# Patient Record
Sex: FEMALE | Race: WHITE
[De-identification: names, ages, dates, MRNs, and addresses within clinical notes are randomized per-mention and may not be internally consistent; named-entity substitution may affect disease eponyms.]

---

## 2017-02-01 ENCOUNTER — HOSPITAL ENCOUNTER (OUTPATIENT)
Dept: HOSPITAL 62 - RAD | Age: 67
End: 2017-02-01
Attending: SPECIALIST
Payer: COMMERCIAL

## 2017-02-01 DIAGNOSIS — R07.9: Primary | ICD-10-CM

## 2017-02-01 DIAGNOSIS — R06.00: ICD-10-CM

## 2017-02-01 PROCEDURE — A9500 TC99M SESTAMIBI: HCPCS

## 2017-02-01 PROCEDURE — 93017 CV STRESS TEST TRACING ONLY: CPT

## 2017-02-01 PROCEDURE — 78452 HT MUSCLE IMAGE SPECT MULT: CPT

## 2017-02-01 NOTE — DRAGON STRESS TEST REPORT
INTRAVENOUS LEXISCAN CARDIOLITE STRESS TEST USING SINGLE PHOTON EMMISION 
COMPUTERIZED TOMOGRAPHIC.



DATE OF PROCEDURE: February 1, 2017



INDICATION : Chest pain and shortness of breath



CARDIAC RISK FACTORS: Diabetes, hypertension



RESTING EKG: Sinus rhythm, right bundle branch block pattern with secondary ST-
T wave changes



STRESS EKG: No significant changes noted with LexiScan bolus 



REASON FOR TERMINATION: Protocol.





PROCEDURE REPORT: Baseline heart rate 67 beats per minute with blood pressure 
of on 144/88.  Patient had no significant complaints.  Heart rate at 2 minutes 
post bolus 91 with a blood pressure of 148/74.  3 minutes post bolus heart rate 
86 with blood pressure of 154/71.  No significant EKG changes were noted.  
Patient had no significant complaints during the procedure or postprocedure.



CONCLUSIONS: Normal EKG and hemodynamic response to IV LexiScan.







NUCLEAR DATA:

At rest the patient was given 13.71 millicuries of technetium 99 sestamibi 
injected intravenously.  As per protocol rest gated SPECT images were obtained.
  Subsequently the patient was given intravenous LexiScan at a dose of 0.4 mg 
in 5 mL intravenously, followed by flush with normal saline.  Subsequently the 
stress dose of 38.6 millicuries of technetium 99 sestamibi was injected 
intravenously.  As per protocol stress gated images were obtained.  



NUCLEAR INTERPRETATION: Both raw and processed data were used for 
interpretation.  Visual, qualitative, computer-generated quantitative data was 
used.  There was good myocardial uptake of technetium compound.  Motion 
artifact and soft tissue attenuations were noted.  Increased visceral uptake 
was noted.  No definitive areas of transient perfusion defect noted.  No 
definitive areas of fixed perfusion defect or scars noted.



EKG gated imaging showed LV EF at 67 %, rest and stress gated EF similar 
visually.  T. I D. ratio was 1.08.  Lung heart ratio noted to be within normal 
limits 0.26.  No significant extracardiac and abnormal radiotracer activities 
were noted.  RV free wall uptake was noted to be increased.



IMPRESSION: Also refer to comments under nuclear interpretation. Also test 
results needs to be interpreted in the context of pretest probability.





1.  There is no definitive scintigraphic evidence of LexiScan induced 
myocardial ischemia.

2.  There is no definitive scintigraphic evidence of myocardial infarction/scar.

3.  EKG gated imaging shows left ejection fraction of approximately 67 %. 

4.  Clinical correlation requested as occasionally single vessel disease or 
balanced ischemia could be missed. In approximately 10% of the cases Lexiscan 
may not cause adequate vasodilatory stress.



RECOMMENDATIONS:

Aggressive risk factor modification, medical therapy.

Clinical correlation with echocardiogram derived ejection fraction.

Inability to exercise by itself can lead to increased cardiovascular event 
risks.

Consider cardiology consultation if clinically indicated.

I AM AVAILABLE FOR CARDIOLOGY CONSULTATION AND FOLLOWUP IF REQUESTED BY PMD











Pino Nicole M.D., [facility]

Consultant cardiologist,

Board certified in cardiovascular diseases, 

Nuclear cardiology, 

Echocardiography

Cardiac CT and cardiac MRI

Ph. [phone] 

Fax [phone]
[facility]

## 2017-06-13 ENCOUNTER — HOSPITAL ENCOUNTER (OUTPATIENT)
Dept: HOSPITAL 62 - RAD | Age: 67
End: 2017-06-13
Attending: ORTHOPAEDIC SURGERY
Payer: COMMERCIAL

## 2017-06-13 DIAGNOSIS — M75.112: Primary | ICD-10-CM

## 2017-06-13 DIAGNOSIS — M75.52: ICD-10-CM

## 2017-06-13 DIAGNOSIS — M19.012: ICD-10-CM

## 2017-06-13 PROCEDURE — 73040 CONTRAST X-RAY OF SHOULDER: CPT

## 2017-06-13 PROCEDURE — 77002 NEEDLE LOCALIZATION BY XRAY: CPT

## 2017-06-13 PROCEDURE — A9576 INJ PROHANCE MULTIPACK: HCPCS

## 2017-06-13 PROCEDURE — BP09ZZZ PLAIN RADIOGRAPHY OF LEFT SHOULDER: ICD-10-PCS

## 2017-06-13 PROCEDURE — 73222 MRI JOINT UPR EXTREM W/DYE: CPT

## 2017-06-13 NOTE — RADIOLOGY REPORT (SQ)
EXAM DESCRIPTION:  ARTHRO SHOULDER; FLUORO/NEEDLE PLACEMENT



COMPLETED DATE/TIME:  6/13/2017 1:40 pm



REASON FOR STUDY:  INCOMPLETE ROTATOR CUFF TEAR OR RUPTURE OF L SHOULDER M75.112  INCOMPLETE ROTATR-C
UFF TEAR/RUPTR OF L SHOULDER, NOT



COMPARISON:  None.



FLUOROSCOPY TIME:  19 seconds.

1 images saved to PACS.



LIMITATIONS:  None.



PROCEDURE:  Procedure, risks, benefits and alternatives explained to patient who then gave written co
nsent. The left shoulder was marked and a time out was called for correct procedure verification.  Po
sterior entry site marked using fluoroscopic guidance.  Shoulder prepped and draped using sterile nolvia
hnique.  Local anesthesia achieved using 1% lidocaine injection.  Hypodermic needle introduced into t
he joint space under direct fluoroscopic visualization. Non-ionic contrast instilled to confirm intra
-articular position. Dilute gadolinium solution then injected.  Needle removed and entry site covered
 with sterile bandage. No immediate complications noted.



TECHNIQUE:  Digital images acquired during fluoroscopy and stored on PACS.   Patient immediately take
n to the MR suite for additional imaging.

INJECTION LOCATION: Posterior left shoulder.

CONTRAST TYPE AND AMOUNT: 1 mL Isovue-300 and 10 mL ProHance saline mixture.



IMPRESSION:  SUCCESSFUL NEEDLE PLACEMENT AND INJECTION FOR LEFT SHOULDER MR ARTHROGRAM USING POSTERIO
R APPROACH.



COMMENT:  Quality :  Final reports for procedures using fluoroscopy that document radiation exp
osure indices, or exposure time and number of fluorographic images (if radiation exposure indices are
 not available)



TECHNICAL DOCUMENTATION:  JOB ID:  [id number]

 2011 [employer]- All Rights Reserved

## 2017-06-13 NOTE — RADIOLOGY REPORT (SQ)
EXAM DESCRIPTION:  ARTHRO SHOULDER; FLUORO/NEEDLE PLACEMENT



COMPLETED DATE/TIME:  6/13/2017 1:40 pm



REASON FOR STUDY:  INCOMPLETE ROTATOR CUFF TEAR OR RUPTURE OF L SHOULDER M75.112  INCOMPLETE ROTATR-C
UFF TEAR/RUPTR OF L SHOULDER, NOT



COMPARISON:  None.



FLUOROSCOPY TIME:  19 seconds.

1 images saved to PACS.



LIMITATIONS:  None.



PROCEDURE:  Procedure, risks, benefits and alternatives explained to patient who then gave written co
nsent. The left shoulder was marked and a time out was called for correct procedure verification.  Po
sterior entry site marked using fluoroscopic guidance.  Shoulder prepped and draped using sterile nolvia
hnique.  Local anesthesia achieved using 1% lidocaine injection.  Hypodermic needle introduced into t
he joint space under direct fluoroscopic visualization. Non-ionic contrast instilled to confirm intra
-articular position. Dilute gadolinium solution then injected.  Needle removed and entry site covered
 with sterile bandage. No immediate complications noted.



TECHNIQUE:  Digital images acquired during fluoroscopy and stored on PACS.   Patient immediately take
n to the MR suite for additional imaging.

INJECTION LOCATION: Posterior left shoulder.

CONTRAST TYPE AND AMOUNT: 1 mL Isovue-300 and 10 mL ProHance saline mixture.



IMPRESSION:  SUCCESSFUL NEEDLE PLACEMENT AND INJECTION FOR LEFT SHOULDER MR ARTHROGRAM USING POSTERIO
R APPROACH.



COMMENT:  Quality :  Final reports for procedures using fluoroscopy that document radiation exp
osure indices, or exposure time and number of fluorographic images (if radiation exposure indices are
 not available)



TECHNICAL DOCUMENTATION:  JOB ID:  [id number]

 2011 [employer]- All Rights Reserved

## 2017-06-14 NOTE — RADIOLOGY REPORT (SQ)
EXAM DESCRIPTION:  MRI LT UPPER JOINT WITH



COMPLETED DATE/TIME:  6/13/2017 2:34 pm



REASON FOR STUDY:  INCOMPLETE ROTATOR CUFF TEAR OR RUPTURE OF L SHOULDER M75.112  INCOMPLETE ROTATR-C
UFF TEAR/RUPTR OF L SHOULDER, NOT



COMPARISON:   None.



TECHNIQUE:  Left shoulder images acquired and stored on PACS. Oblique coronal, oblique sagittal, and 
axial imaging to include fat sensitive sequences as T1, water sensitive sequences as FST2/STIR, and c
ontrast sensitive sequences as FST1.



LIMITATIONS:  Considerable extravasation along the deep anterior tissues.



FINDINGS:  JOINT DISTENTION: Adequate for diagnosis.  No loose bodies.

BONE MARROW AND CORTEX: No acute or suspicious findings.

AC JOINT: AC joint mildly overgrown, degenerative hypertrophy.  No significant acromial spurring.  Re
lative maintenance of the subacromial space.

GLENOHUMERAL JOINT: No subluxation or dislocation.  No focal chondral lesions are detected.

ROTATOR CUFF: Tendinosis, generalized thickening and heterogeneity.  There is partial thickness bursa
l surface tearing along anterior supraspinatus insertion.  There is also some articular surface parti
al tear.  No full-thickness breech identified, however.  No overt cuff muscle atrophy.

LABRUM AND BICEPS LABRAL COMPLEX: Slight fraying in the biceps anchor.  Biceps tendon looks normal.

INFERIOR LABRAL COMPLEX: Generally intact.

ADJACENT SOFT TISSUES: No axillary adenopathy or regional mass detected.

OTHER: Moderate to marked subcoracoid bursitis.



IMPRESSION:  1.  Cuff disease, predominantly tendinosis with partial tear.  No full-thickness gap in 
the cuff clearly identified.  2.  Subcoracoid bursitis.  3. Other findings include AC arthropathy and
 mild superior labral fraying.



TECHNICAL DOCUMENTATION:  JOB ID:  [id number]

 2011 [email or web address]- All Rights Reserved

## 2017-07-17 ENCOUNTER — HOSPITAL ENCOUNTER (OUTPATIENT)
Dept: HOSPITAL 62 - OD | Age: 67
End: 2017-07-17
Attending: ORTHOPAEDIC SURGERY
Payer: COMMERCIAL

## 2017-07-17 DIAGNOSIS — Z01.812: ICD-10-CM

## 2017-07-17 DIAGNOSIS — Z01.818: ICD-10-CM

## 2017-07-17 DIAGNOSIS — Z01.810: Primary | ICD-10-CM

## 2017-07-17 LAB
ANION GAP SERPL CALC-SCNC: 10 MMOL/L (ref 5–19)
BASOPHILS # BLD AUTO: 0 10^3/UL (ref 0–0.2)
BASOPHILS NFR BLD AUTO: 0.5 % (ref 0–2)
BUN SERPL-MCNC: 11 MG/DL (ref 7–20)
CALCIUM: 8.6 MG/DL (ref 8.4–10.2)
CHLORIDE SERPL-SCNC: 105 MMOL/L (ref 98–107)
CO2 SERPL-SCNC: 26 MMOL/L (ref 22–30)
CREAT SERPL-MCNC: 0.69 MG/DL (ref 0.52–1.25)
EOSINOPHIL # BLD AUTO: 0.3 10^3/UL (ref 0–0.6)
EOSINOPHIL NFR BLD AUTO: 5.5 % (ref 0–6)
ERYTHROCYTE [DISTWIDTH] IN BLOOD BY AUTOMATED COUNT: 14.2 % (ref 11.5–14)
GLUCOSE SERPL-MCNC: 109 MG/DL (ref 75–110)
HCT VFR BLD CALC: 42.7 % (ref 36–47)
HGB BLD-MCNC: 14.2 G/DL (ref 12–15.5)
HGB HCT DIFFERENCE: -0.1
LYMPHOCYTES # BLD AUTO: 1 10^3/UL (ref 0.5–4.7)
LYMPHOCYTES NFR BLD AUTO: 19.4 % (ref 13–45)
MCH RBC QN AUTO: 32.4 PG (ref 27–33.4)
MCHC RBC AUTO-ENTMCNC: 33.3 G/DL (ref 32–36)
MCV RBC AUTO: 97 FL (ref 80–97)
MONOCYTES # BLD AUTO: 0.6 10^3/UL (ref 0.1–1.4)
MONOCYTES NFR BLD AUTO: 11.9 % (ref 3–13)
NEUTROPHILS # BLD AUTO: 3.2 10^3/UL (ref 1.7–8.2)
NEUTS SEG NFR BLD AUTO: 62.7 % (ref 42–78)
POTASSIUM SERPL-SCNC: 3.8 MMOL/L (ref 3.6–5)
RBC # BLD AUTO: 4.38 10^6/UL (ref 3.72–5.28)
SODIUM SERPL-SCNC: 141.2 MMOL/L (ref 137–145)
WBC # BLD AUTO: 5.2 10^3/UL (ref 4–10.5)

## 2017-07-17 PROCEDURE — 85025 COMPLETE CBC W/AUTO DIFF WBC: CPT

## 2017-07-17 PROCEDURE — 93005 ELECTROCARDIOGRAM TRACING: CPT

## 2017-07-17 PROCEDURE — 80048 BASIC METABOLIC PNL TOTAL CA: CPT

## 2017-07-17 PROCEDURE — 93010 ELECTROCARDIOGRAM REPORT: CPT

## 2017-07-17 PROCEDURE — 36415 COLL VENOUS BLD VENIPUNCTURE: CPT

## 2017-07-17 PROCEDURE — 71020: CPT

## 2017-07-17 NOTE — EKG REPORT
SEVERITY:- ABNORMAL ECG -

SINUS RHYTHM

INCOMPLETE RIGHT BUNDLE BRANCH BLOCK

PROBABLE INFERIOR INFARCT, OLD

:

Confirmed by: Pino Nicole 17-Jul-2017 12:19:38

## 2017-07-17 NOTE — RADIOLOGY REPORT (SQ)
EXAM DESCRIPTION:  CHEST PA/LATERAL



COMPLETED DATE/TIME:  7/17/2017 12:16 pm



REASON FOR STUDY:  PRE OP Z01.818  ENCOUNTER FOR OTHER PREPROCEDURAL EXAMINATION



COMPARISON:  12/26/2012



NUMBER OF VIEWS:  Two view.



TECHNIQUE:  Frontal and lateral radiographic views of the chest acquired.



LIMITATIONS:  None.



FINDINGS:  LUNGS AND PLEURA: No opacities, masses or pneumothorax. No pleural effusion.

MEDIASTINUM AND HILAR STRUCTURES: No masses or contour abnormalities.

HEART AND VASCULATURE: Heart normal size.  No evidence for failure.  Prior CABG.

BONES: No acute findings.

HARDWARE: CABG hardware.

OTHER: No other significant finding.



IMPRESSION:  NO SIGNIFICANT RADIOGRAPHIC FINDING IN THE CHEST. PRIOR CABG.



TECHNICAL DOCUMENTATION:  JOB ID:  [id number]

 2011 [employer]- All Rights Reserved

## 2017-07-21 ENCOUNTER — HOSPITAL ENCOUNTER (OUTPATIENT)
Dept: HOSPITAL 62 - OD | Age: 67
End: 2017-07-21
Attending: FAMILY MEDICINE
Payer: COMMERCIAL

## 2017-07-21 DIAGNOSIS — M67.442: ICD-10-CM

## 2017-07-21 DIAGNOSIS — M19.90: ICD-10-CM

## 2017-07-21 DIAGNOSIS — M79.662: Primary | ICD-10-CM

## 2017-07-21 DIAGNOSIS — M67.441: ICD-10-CM

## 2017-07-21 PROCEDURE — 93971 EXTREMITY STUDY: CPT

## 2017-07-21 NOTE — RADIOLOGY REPORT (SQ)
EXAM DESCRIPTION:  HAND BILATERAL 3 VIEWS



COMPLETED DATE/TIME:  7/21/2017 10:46 am



REASON FOR STUDY:  GANGLION, RIGHT HAND,GANGLION, LEFT HAND M67.441  GANGLION, RIGHT HAND M67.442  GA
NGLION, LEFT HAND



COMPARISON:  None.



EXAM PARAMETERS:  NUMBER OF VIEWS: Three views right hand. Three views left hand.

TECHNIQUE: AP, lateral and oblique  radiographic images acquired of bilateral hands.

LIMITATIONS: None.



FINDINGS:  RIGHT HAND:

MINERALIZATION: Normal.

BONES: No acute fracture or dislocation. No worrisome bone lesions.

JOINTS: No erosions.  No parrish-articular osteopenia.  No chondrocalcinosis.  There are some minimal os
teoarthritic changes at several of the interphalangeal joints.

SOFT TISSUES: No swelling.  No calcifications.

OTHER: No other significant finding.

LEFT HAND:

MINERALIZATION: Normal.

BONES: No acute fracture or dislocation. No worrisome bone lesions.

JOINTS: No erosions.  No parrish-articular osteopenia.  No chondrocalcinosis.  There are some minimal os
teoarthritic changes at several of the interphalangeal joints

SOFT TISSUES: No swelling.  No calcifications.

OTHER: No other significant finding.



IMPRESSION:  Mild degenerative changes as noted above.  No other significant findings.



TECHNICAL DOCUMENTATION:  JOB ID:  [id number]

 2011 [employer]- All Rights Reserved

## 2017-07-21 NOTE — XCELERA REPORT
[address] [address]

                             Tel: [phone]

                             Fax: [phone]



                     Lower Extremity Venous Evaluation

____________________________________________________________________________



Name: JONG GANDHI

MRN: [id number]                Age: 67 yrs

Gender: Female                 : 1950

Patient Status: Outpatient     Patient Location: OD

Account #: [id number]

Study Date: 2017 11:07 AM

Accession #: [id number]

____________________________________________________________________________



Procedure: Color flow and duplex imaging of the veins of the left lower

extremity as well as the right Common Femoral vein.

Reason For Study: LLE PAIN





Ordering Physician: AMAYA ANGELES

Performed By: Silke Skaggs

____________________________________________________________________________



____________________________________________________________________________





Right Sided Venous Evaluation

The right common femoral vein is fully compressible. Spontaneous and phasic

flow is present in the right common femoral vein.



Left Sided Venous Evaluation

Normal vessel filling wall to wall, compression and augmentation as well as

Colour flow down to the infrageniculate veins.



Critical Findings

Result called in at 1220.

____________________________________________________________________________





Interpretation Summary

No duplex evidence of DVT or obstruction in the left lower extremity nor in

the right Common Femoral vein.

____________________________________________________________________________



____________________________________________________________________________



Electronically signed by:      Lennox Williams      on 2017 04:54 PM



CC: AMAYA ANGELES

>

Williams, Lennox

## 2017-07-26 ENCOUNTER — HOSPITAL ENCOUNTER (EMERGENCY)
Dept: HOSPITAL 62 - ER | Age: 67
Discharge: HOME | End: 2017-07-26
Payer: COMMERCIAL

## 2017-07-26 VITALS — SYSTOLIC BLOOD PRESSURE: 135 MMHG | DIASTOLIC BLOOD PRESSURE: 62 MMHG

## 2017-07-26 DIAGNOSIS — M25.562: Primary | ICD-10-CM

## 2017-07-26 DIAGNOSIS — Z88.8: ICD-10-CM

## 2017-07-26 DIAGNOSIS — Z88.2: ICD-10-CM

## 2017-07-26 DIAGNOSIS — I10: ICD-10-CM

## 2017-07-26 DIAGNOSIS — Z88.1: ICD-10-CM

## 2017-07-26 DIAGNOSIS — Z88.0: ICD-10-CM

## 2017-07-26 DIAGNOSIS — I25.10: ICD-10-CM
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
## 2017-07-26 DIAGNOSIS — Z88.5: ICD-10-CM

## 2017-07-26 DIAGNOSIS — Z95.1: ICD-10-CM

## 2017-07-26 PROCEDURE — L1830 KO IMMOB CANVAS LONG PRE OTS: HCPCS

## 2017-07-26 PROCEDURE — 73562 X-RAY EXAM OF KNEE 3: CPT

## 2017-07-26 PROCEDURE — 99283 EMERGENCY DEPT VISIT LOW MDM: CPT

## 2017-07-26 NOTE — RADIOLOGY REPORT (SQ)
EXAM DESCRIPTION:  KNEE LEFT 4 VIEW



COMPLETED DATE/TIME:  7/26/2017 10:02 pm



REASON FOR STUDY:  pain



COMPARISON:  None.



NUMBER OF VIEWS:  Four views.



TECHNIQUE:  AP, lateral, and both oblique radiographic images acquired of the left knee.



LIMITATIONS:  None.



FINDINGS:  MINERALIZATION: Normal.

BONES: No acute fracture or dislocation.  No worrisome bone lesions.

JOINT: No effusion.

SOFT TISSUES: No soft tissue swelling.  No radio-opaque foreign body.

OTHER: No other significant finding.



IMPRESSION:  NEGATIVE STUDY OF THE LEFT KNEE. NO RADIOGRAPHIC EVIDENCE OF ACUTE INJURY.



TECHNICAL DOCUMENTATION:  JOB ID:  [id number]

 2011 [employer]- All Rights Reserved

## 2017-07-26 NOTE — ER DOCUMENT REPORT
ED General





- General


Chief Complaint: Knee Injury


Stated Complaint: LEFT KNEE PAIN


Time Seen by Provider: 07/26/17 22:27


Mode of Arrival: Medic


Information source: Patient


Notes: 


67-year-old female presents with complaints of left knee pain.  Patient notes 

pain occurred suddenly prior to arrival, patient was ambulating twisted to turn 

and felt a popping sensation.  Patient was unable to bear weight afterwards.


TRAVEL OUTSIDE OF THE [place] IN LAST 30 DAYS: No





- HPI


Onset: Just prior to arrival


Onset/Duration: Sudden


Quality of pain: Sharp


Severity: Mild


Pain Level: 1


Associated symptoms: Other


Exacerbated by: Movement, Walking


Relieved by: Denies


Similar symptoms previously: No


Recently seen / treated by doctor: No





- Related Data


Allergies/Adverse Reactions: 


 





cefuroxime axetil [From Ceftin] Allergy (Verified 07/26/17 21:41)


 


codeine [Codeine] Allergy (Verified 07/26/17 21:41)


 Tachycardia


hydrocodone [Hydrocodone] Allergy (Verified 07/26/17 21:41)


 Tachycardia


Penicillins Allergy (Verified 07/26/17 21:41)


 


prochlorperazine edisylate [From Compazine] Allergy (Verified 07/26/17 21:41)


 Jaundice


prochlorperazine maleate [From Compazine] Allergy (Verified 07/26/17 21:41)


 Jaundice


Sulfa (Sulfonamide Antibiotics) Allergy (Verified 07/26/17 21:41)


 rash











Past Medical History





- Social History


Smoking Status: Never Smoker


Cigarette use (# per day): No


Chew tobacco use (# tins/day): No


Smoking Education Provided: No


Family History: Reviewed & Not Pertinent


Patient has suicidal ideation: No


Patient has homicidal ideation: No





- Past Medical History


Cardiac Medical History: Reports: Hx Coronary Artery Disease, Hx 

Hypercholesterolemia, Hx Hypertension


Neurological Medical History: Reports: Hx Migraine


Endocrine Medical History: Reports: Hx Hypothyroidism


Renal/ Medical History: Denies: Hx Peritoneal Dialysis


Psychiatric Medical History: Reports: Hx Anxiety


Past Surgical History: Reports: Hx Appendectomy, Hx Cardiac Surgery - Bypass, 

Hx Cholecystectomy, Hx Orthopedic Surgery - right knee surgery, Hx Tonsillectomy





- Immunizations


Immunizations up to date: Yes


Hx Diphtheria, Pertussis, Tetanus Vaccination: Yes


Hx Pneumococcal Vaccination: 11/01/13





Review of Systems





- Review of Systems


Notes: 


REVIEW OF SYSTEMS:


CONSTITUTIONAL :  Denies fever,  chills, or sweats.  Denies recent illness.


EENT:   Denies eye, ear, throat, or mouth pain or symptoms.  Denies nasal or 

sinus congestion or discharge.  Denies throat, tongue, or mouth swelling or 

difficulty swallowing.


CARDIOVASCULAR:  Denies chest pain.  Denies palpitations or racing or irregular 

heart beat.  Denies ankle edema.


RESPIRATORY:  Denies cough, cold, or chest congestion.  Denies shortness of 

breath, difficulty breathing, or wheezing.


GASTROINTESTINAL:  Denies abdominal pain or distention.  Denies nausea, vomiting

, or diarrhea.  Denies blood in vomitus, stools, or per rectum.  Denies black, 

tarry stools.  Denies constipation. 


GENITOURINARY:  Denies difficulty urinating, painful urination, burning, 

frequency, blood in urine, or discharge.


FEMALE  GENITOURINARY:  Denies vaginal bleeding, heavy or abnormal periods, 

irregular periods.  Denies vaginal discharge or odor. 


MUSCULOSKELETAL: Admits to left knee pain


SKIN:   Denies rash, lesions or sores.


HEMATOLOGIC :   Denies easy bruising or bleeding.


LYMPHATIC:  Denies swollen, enlarged glands.


NEUROLOGICAL:  Denies confusion or altered mental status.  Denies passing out 

or loss of consciousness.  Denies dizziness or lightheadedness.  Denies 

headache.  Denies weakness or paralysis or loss of use of either side.  Denies 

problems with gait or speech.  Denies sensory loss, numbness, or tingling.  

Denies seizures.


PSYCHIATRIC:  Denies anxiety or stress.  Denies depression, suicidal ideation, 

or homicidal ideation.





ALL OTHER SYSTEMS REVIEWED AND NEGATIVE.











PHYSICAL EXAMINATION:





GENERAL: Well-appearing, well-nourished and in no acute distress.





HEAD: Atraumatic, normocephalic.





EYES: Pupils equal round and reactive to light, extraocular movements intact, 

conjunctiva are normal.





ENT: Nares patent, oropharynx clear without exudates.  Moist mucous membranes.





NECK: Normal range of motion, supple without lymphadenopathy





LUNGS: Breath sounds clear to auscultation bilaterally and equal.  No wheezes 

rales or rhonchi.





HEART: Regular rate and rhythm without murmurs





ABDOMEN: Soft, nontender, nondistended abdomen.  No guarding, no rebound.  No 

masses appreciated.





Female : deferred





Musculoskeletal: Limited range of motion secondary to pain, there is tenderness 

in the popliteal region





NEUROLOGICAL: Cranial nerves grossly intact.  Normal speech, normal gait.  

Normal sensory, motor exams





PSYCH: Normal mood, normal affect.





SKIN: Warm, Dry, normal turgor, no rashes or lesions noted.

















Dictation was performed using Dragon voice recognition software





Physical Exam





- Vital signs


Vitals: 





 











Temp Pulse Resp BP Pulse Ox


 


 98.6 F   72   16   142/73 H  94 


 


 07/26/17 21:41  07/26/17 21:41  07/26/17 21:41  07/26/17 21:41  07/26/17 21:41














Course





- Re-evaluation


Re-evalutation: 





07/26/17 23:16


Patient's has been having achiness in the back of her leg over the past 2 weeks 

feels that it has been strained, she had a Doppler performed a few days prior 

which was negative.  Patient felt a popping sensation today.  She is otherwise 

well-appearing no distress








Patient will be placed in knee immobilizer given crutches and follow-up with 

orthopedic











After performing a Medical Screening Examination, I estimate there is LOW risk 

for INTRACRANIAL HEMORRHAGE, UNSTABLE SPINE FRACTURE, CENTRAL CORD SYNDROME, 

CAUDA EQUINA, THORACIC AORTIC DISSECTION, PNEUMOTHORAX, PERFORATED BOWEL, 

RUPTURED ABDOMINAL AORTIC ANEURYSM, ACUTE TENDON RUPTURE, COMPARTMENT SYNDROME, 

or OPEN FRACTURE, thus I consider the discharge disposition reasonable. Also, 

there is no evidence or peritonitis, sepsis, or toxicity.  I have reevaluated 

this patient multiple times and no significant life threatening changes are 

noted. The patient and I have discussed the diagnosis and risks, and we agree 

with discharging home to follow-up with their primary doctor with the 

understanding that symptoms and presentations can change. We also discussed 

returning to the Emergency Department immediately if new or worsening symptoms 

occur. We have discussed the symptoms which are most concerning (e.g., bloody 

stool, fever, changing or worsening pain, vomiting) that necessitate immediate 

return.





- Vital Signs


Vital signs: 





 











Temp Pulse Resp BP Pulse Ox


 


 98.6 F   72   16   142/73 H  94 


 


 07/26/17 21:41  07/26/17 21:41  07/26/17 21:41  07/26/17 21:41  07/26/17 21:41














- Diagnostic Test


Radiology reviewed: Image reviewed, Reports reviewed - No acute fracture





Discharge





- Discharge


Clinical Impression: 


 Strain of ligament





Left knee pain


Qualifiers:


 Chronicity: acute Qualified Code(s): M25.562 - Pain in left knee





Condition: Stable


Disposition: HOME, SELF-CARE


Instructions:  Use of Crutches (OM), Suspected Internal Knee Injury (OM)


Prescriptions: 


Oxycodone HCl/Acetaminophen [Percocet 5-325 mg Tablet] 1 - 2 tab PO Q4H PRN #15 

tablet


 PRN Reason: 


Forms:  Return to Work


Referrals: 


GOOD PAULINO MD [ACTIVE STAFF] - Follow up tomorrow

## 2017-08-05 ENCOUNTER — HOSPITAL ENCOUNTER (OUTPATIENT)
Dept: HOSPITAL 62 - RAD | Age: 67
End: 2017-08-05
Attending: ORTHOPAEDIC SURGERY
Payer: COMMERCIAL

## 2017-08-05 DIAGNOSIS — M25.562: Primary | ICD-10-CM

## 2017-08-07 NOTE — RADIOLOGY REPORT (SQ)
EXAM DESCRIPTION:  MRI LT LOWER JOINT WITHOUT



COMPLETED DATE/TIME:  8/5/2017 9:11 am



REASON FOR STUDY:  LEFT KNEE PAIN M25.562  PAIN IN LEFT KNEE



COMPARISON:  None.



TECHNIQUE:  Leftknee images acquired and stored on PACS.  Multiplanar images include fat sensitive se
quences as T1, water sensitive sequences as FST2 or STIR, cartilage sensitive sequences as FSPD, and 
gradient echo sequences.



LIMITATIONS:  Patient motion.



FINDINGS:  JOINT AND BURSAE: Small effusion.

BONE CORTEX AND MARROW: No alteration of signal to suggest marrow replacement. No worrisome bone lesi
ons. No occult fracture.

ACL: Intact. No degeneration or ganglion cyst.

PCL: Increased signal, but intact.

MCL: Intact. No periligamentous edema or fluid.

LCL: Intact. No periligamentous edema or fluid.

MEDIAL MENISCUS: Medial migration.  Free edge tear posterior horn.  Posterior root is not well visual
ized and may be partially torn.  No displaced meniscal fragment.

LATERAL MENISCUS: Intact.

MEDIAL COMPARTMENT: Small osteophytes.  Subchondral cyst formation posterior femoral condyle.  No loo
se bodies or bone bruises.

LATERAL COMPARTMENT: Thinning of the articular cartilage.  Subchondral cyst formation anterior femora
l condyle near the trochlea.

PATELLA: Small osteophytes.  Mild subchondral cyst formation.  Intact retinaculum.

EXTENSOR MECHANISM: Intact. Quadriceps and patella tendons normal.

SOFT TISSUES: Fluid between the medial head of the gastrocnemius and semimembranosus tendons measurin
g about 2.5 cm in maximum diameter.

OTHER: No other significant finding.



IMPRESSION:

1. Technical limitations due to motion.  Low-grade sprain PCL.

2. Free edge tear posterior horn medial meniscus.  There may be a partial tear of the posterior root.


3. Chondromalacia.  Osteoarthritis.

4. Joint effusion.

5. Baker's cyst.



TECHNICAL DOCUMENTATION:  JOB ID:  [id number]

 [employer]- All Rights Reserved

## 2017-12-06 LAB
ANION GAP SERPL CALC-SCNC: 13 MMOL/L (ref 5–19)
APPEARANCE UR: CLEAR
BACTERIA #/AREA URNS HPF: (no result) /HPF
BASOPHILS # BLD AUTO: 0 10^3/UL (ref 0–0.2)
BASOPHILS NFR BLD AUTO: 0.4 % (ref 0–2)
BILIRUB UR QL STRIP: NEGATIVE
BUN SERPL-MCNC: 16 MG/DL (ref 7–20)
CALCIUM: 9.2 MG/DL (ref 8.4–10.2)
CHLORIDE SERPL-SCNC: 99 MMOL/L (ref 98–107)
CO2 SERPL-SCNC: 30 MMOL/L (ref 22–30)
CREAT SERPL-MCNC: 0.76 MG/DL (ref 0.52–1.25)
EOSINOPHIL # BLD AUTO: 0.1 10^3/UL (ref 0–0.6)
EOSINOPHIL NFR BLD AUTO: 1.4 % (ref 0–6)
ERYTHROCYTE [DISTWIDTH] IN BLOOD BY AUTOMATED COUNT: 14.7 % (ref 11.5–14)
GLUCOSE SERPL-MCNC: 104 MG/DL (ref 75–110)
GLUCOSE UR STRIP-MCNC: NEGATIVE MG/DL
HCT VFR BLD CALC: 44.4 % (ref 36–47)
HGB BLD-MCNC: 15.3 G/DL (ref 12–15.5)
HGB HCT DIFFERENCE: 1.5
KETONES UR STRIP-MCNC: NEGATIVE MG/DL
LYMPHOCYTES # BLD AUTO: 1.7 10^3/UL (ref 0.5–4.7)
LYMPHOCYTES NFR BLD AUTO: 22.9 % (ref 13–45)
MCH RBC QN AUTO: 32.8 PG (ref 27–33.4)
MCHC RBC AUTO-ENTMCNC: 34.5 G/DL (ref 32–36)
MCV RBC AUTO: 95 FL (ref 80–97)
MONOCYTES # BLD AUTO: 0.9 10^3/UL (ref 0.1–1.4)
MONOCYTES NFR BLD AUTO: 12.1 % (ref 3–13)
NEUTROPHILS # BLD AUTO: 4.7 10^3/UL (ref 1.7–8.2)
NEUTS SEG NFR BLD AUTO: 63.2 % (ref 42–78)
NITRITE UR QL STRIP: NEGATIVE
PH UR STRIP: 5 [PH] (ref 5–9)
POTASSIUM SERPL-SCNC: 4.4 MMOL/L (ref 3.6–5)
PROT UR STRIP-MCNC: NEGATIVE MG/DL
RBC # BLD AUTO: 4.66 10^6/UL (ref 3.72–5.28)
SODIUM SERPL-SCNC: 141.8 MMOL/L (ref 137–145)
SP GR UR STRIP: 1.01
UROBILINOGEN UR-MCNC: NEGATIVE MG/DL (ref ?–2)
WBC # BLD AUTO: 7.4 10^3/UL (ref 4–10.5)
WBC #/AREA URNS HPF: (no result) /HPF

## 2017-12-06 NOTE — RADIOLOGY REPORT (SQ)
EXAM DESCRIPTION:  CHEST PA/LATERAL



COMPLETED DATE/TIME:  12/6/2017 11:47 am



REASON FOR STUDY:  PRE OP



COMPARISON:  None.



EXAM PARAMETERS:  NUMBER OF VIEWS: two views

TECHNIQUE: Digital Frontal and Lateral radiographic views of the chest acquired.

RADIATION DOSE: NA

LIMITATIONS: none



FINDINGS:  LUNGS AND PLEURA: No opacities, masses or pneumothorax. No pleural effusion.

MEDIASTINUM AND HILAR STRUCTURES: No masses or contour abnormalities.

HEART AND VASCULAR STRUCTURES: Heart normal size.  No evidence for failure.

BONES: No acute findings.

HARDWARE: None in the chest.

OTHER: No other significant finding.



IMPRESSION:  NO SIGNIFICANT RADIOGRAPHIC FINDING IN THE CHEST.



TECHNICAL DOCUMENTATION:  JOB ID:  [id number]

 2011 [employer]- All Rights Reserved

## 2017-12-06 NOTE — EKG REPORT
SEVERITY:- ABNORMAL ECG -

SINUS RHYTHM

INCOMPLETE RIGHT BUNDLE BRANCH BLOCK

PROBABLE LEFT VENTRICULAR HYPERTROPHY

BORDERLINE INFERIOR Q WAVES

:

Confirmed by: Pino Nicole 06-Dec-2017 12:08:21

## 2017-12-14 ENCOUNTER — HOSPITAL ENCOUNTER (OUTPATIENT)
Dept: HOSPITAL 62 - OROUT | Age: 67
Discharge: HOME | End: 2017-12-14
Attending: ORTHOPAEDIC SURGERY
Payer: COMMERCIAL

## 2017-12-14 VITALS — SYSTOLIC BLOOD PRESSURE: 161 MMHG | DIASTOLIC BLOOD PRESSURE: 92 MMHG

## 2017-12-14 DIAGNOSIS — M19.90: ICD-10-CM

## 2017-12-14 DIAGNOSIS — Z79.899: ICD-10-CM

## 2017-12-14 DIAGNOSIS — I10: ICD-10-CM

## 2017-12-14 DIAGNOSIS — Z95.1: ICD-10-CM

## 2017-12-14 DIAGNOSIS — Z79.82: ICD-10-CM

## 2017-12-14 DIAGNOSIS — M94.262: ICD-10-CM

## 2017-12-14 DIAGNOSIS — Z88.0: ICD-10-CM

## 2017-12-14 DIAGNOSIS — Z88.2: ICD-10-CM

## 2017-12-14 DIAGNOSIS — Z79.84: ICD-10-CM

## 2017-12-14 DIAGNOSIS — S83.222D: Primary | ICD-10-CM

## 2017-12-14 DIAGNOSIS — E11.9: ICD-10-CM

## 2017-12-14 DIAGNOSIS — E03.9: ICD-10-CM

## 2017-12-14 DIAGNOSIS — Z88.5: ICD-10-CM

## 2017-12-14 DIAGNOSIS — Z88.8: ICD-10-CM
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
## 2017-12-14 DIAGNOSIS — Z87.892: ICD-10-CM

## 2017-12-14 PROCEDURE — 84132 ASSAY OF SERUM POTASSIUM: CPT

## 2017-12-14 PROCEDURE — 93005 ELECTROCARDIOGRAM TRACING: CPT

## 2017-12-14 PROCEDURE — 82962 GLUCOSE BLOOD TEST: CPT

## 2017-12-14 PROCEDURE — 85025 COMPLETE CBC W/AUTO DIFF WBC: CPT

## 2017-12-14 PROCEDURE — 29881 ARTHRS KNE SRG MNISECTMY M/L: CPT

## 2017-12-14 PROCEDURE — 36415 COLL VENOUS BLD VENIPUNCTURE: CPT

## 2017-12-14 PROCEDURE — 0SBD4ZZ EXCISION OF LEFT KNEE JOINT, PERCUTANEOUS ENDOSCOPIC APPROACH: ICD-10-PCS | Performed by: ORTHOPAEDIC SURGERY

## 2017-12-14 PROCEDURE — 81001 URINALYSIS AUTO W/SCOPE: CPT

## 2017-12-14 PROCEDURE — 71020: CPT

## 2017-12-14 PROCEDURE — 80048 BASIC METABOLIC PNL TOTAL CA: CPT

## 2017-12-14 PROCEDURE — 93010 ELECTROCARDIOGRAM REPORT: CPT

## 2017-12-14 RX ADMIN — FENTANYL CITRATE ONE MCG: 50 INJECTION INTRAMUSCULAR; INTRAVENOUS at 19:00

## 2017-12-14 RX ADMIN — FENTANYL CITRATE ONE MCG: 50 INJECTION INTRAMUSCULAR; INTRAVENOUS at 18:50

## 2017-12-14 RX ADMIN — FENTANYL CITRATE ONE MCG: 50 INJECTION INTRAMUSCULAR; INTRAVENOUS at 18:55

## 2017-12-14 NOTE — OPERATIVE REPORT
Operative Report


DATE OF SURGERY: 12/14/17


PREOPERATIVE DIAGNOSIS: Left knee medial meniscus tear and chondromalacia of 

medial femoral condyle


POSTOPERATIVE DIAGNOSIS: Same


OPERATION: Left knee arthroscopic partial medial meniscectomy and chondroplasty 

of medial femoral condyle


SURGEON: KAREEM BEAR


ANESTHESIA: GA


TISSUE REMOVED OR ALTERED: None


COMPLICATIONS: 





None


ESTIMATED BLOOD LOSS: Less than 20 mL


INTRAOPERATIVE FINDINGS: As above


PROCEDURE: 





Patient was brought to the operating room and induced and intubated in supine 

position.  A  tourniquet was applied to the left lower extremity.  Timeout was 

done identifying the left knee was the correct site. .25% plain Marcaine was 

injected into anticipated  portal sites.  The extremity was elevated and the 

tourniquet was inflated at 300 mmHg.  11 blade was used to establish the 

anterolateral portal.  Scope was introduced.  At this point I established my 

anteromedial portal.  Diagnostic scope was done showing the patient had intact 

patellofemoral compartment with no evidence of chondromalacia.  I turned my 

attention to the medial compartment where I noted a radial tear of the medial 

meniscus as well as a medial grade III chondromalacia of the medial femoral 

condyle.I used the meniscal biter and 4.0mm shaver to resect majority of the 

posterior horn of the medial meniscus.   Shaver smooth out the edges of the 

meniscus which gave a good stable construct.  I used a 4.0 mm shaver to also do 

a limited abrasion chondroplasty of the medial femoral condyle chondromalacia I 

this point redirected my camera to the notch and visualized the anterior 

cruciate ligament graft which was intact as well as the PCL which showed to be 

intact.  I then placed the extremity in a figure-of-four and at this point saw 

the lateral meniscus was intact with no noticeable tears.  Popliteal hiatus was 

intact.  Articular cartilage also was pristine with no evidence of 

chondromalacia or wear.  At this point the fluid of the knee was removed and I 

proceeded to close the 2 portal sites with 3-0 nylon.  Tourniquet was let down.

  The portal sites were covered with Xeroform 4 x 4 dressing and ABD pad 

followed by a soft roll.  I overwrapped it with an Ace bandage.  Drapes were 

cut and removed.  Patient was successfully extubated and sent to PACU in stable 

condition.

## 2017-12-14 NOTE — PDOC DISCHARGE SUMMARY
Discharge Summary (SDC)





- Discharge


Final Diagnosis: 





Left knee partial medial meniscectomy


Date of Surgery: 12/14/17


Discharge Date: 12/14/17


Condition: Good


Treatment or Instructions: 


Patient instructed to follow up in 10-14 days.


Patient instructed to keep dressing dry clean and intact for 4 days and then 

allowed to remove.  At that point patient can shower and apply Band-Aids as 

needed.


Patient can weight-bear as tolerated and do range of motion exercises as 

tolerated.


Crutches for support and safety.  Can wean crutches once stable on his feet.


Patient instructed to call the office if patient develops fevers chills redness 

and drainage from the surgical sites.


Prescriptions: 


Oxycodone HCl/Acetaminophen [Percocet 5-325 mg Tablet] 1 - 2 tab PO ASDIR PRN #

25 tablet


 PRN Reason: 


Referrals: 


AMAYA ANGELES DO [Primary Care Provider] - 


Discharge Diet: As Tolerated


Respiratory Treatments at Home: Deep Breathing/Coughing


Discharge Activity: Balance Activity w/Rest, No Driving, Keep Legs Elevated, No 

Lifting/Push/Pulling, Walk Frequently


Home Care Assistance: None Needed


Report the Following to Your Physician Immediately: Shortness of Breath, 

Vomiting, Increase in Pain, Fever over 101 Degrees, Unusual Bleeding, Redness, 

Swelling, Warmth, Increased Soreness, Drainage-Yellow, Drainage-Gray, Drainage-

Green, Drainage-Foul Smelling

## 2018-04-26 LAB
ADD MANUAL DIFF: NO
ANION GAP SERPL CALC-SCNC: 13 MMOL/L (ref 5–19)
APPEARANCE UR: (no result)
APTT PPP: YELLOW S
BASOPHILS # BLD AUTO: 0 10^3/UL (ref 0–0.2)
BASOPHILS NFR BLD AUTO: 0.4 % (ref 0–2)
BILIRUB UR QL STRIP: NEGATIVE
BUN SERPL-MCNC: 15 MG/DL (ref 7–20)
CALCIUM: 9 MG/DL (ref 8.4–10.2)
CHLORIDE SERPL-SCNC: 106 MMOL/L (ref 98–107)
CO2 SERPL-SCNC: 27 MMOL/L (ref 22–30)
EOSINOPHIL # BLD AUTO: 0.2 10^3/UL (ref 0–0.6)
EOSINOPHIL NFR BLD AUTO: 2.5 % (ref 0–6)
ERYTHROCYTE [DISTWIDTH] IN BLOOD BY AUTOMATED COUNT: 14.4 % (ref 11.5–14)
GLUCOSE SERPL-MCNC: 102 MG/DL (ref 75–110)
GLUCOSE UR STRIP-MCNC: NEGATIVE MG/DL
HCT VFR BLD CALC: 39.6 % (ref 36–47)
HGB BLD-MCNC: 13.7 G/DL (ref 12–15.5)
KETONES UR STRIP-MCNC: NEGATIVE MG/DL
LYMPHOCYTES # BLD AUTO: 1.2 10^3/UL (ref 0.5–4.7)
LYMPHOCYTES NFR BLD AUTO: 19 % (ref 13–45)
MCH RBC QN AUTO: 32.8 PG (ref 27–33.4)
MCHC RBC AUTO-ENTMCNC: 34.5 G/DL (ref 32–36)
MCV RBC AUTO: 95 FL (ref 80–97)
MONOCYTES # BLD AUTO: 0.6 10^3/UL (ref 0.1–1.4)
MONOCYTES NFR BLD AUTO: 9.4 % (ref 3–13)
NEUTROPHILS # BLD AUTO: 4.4 10^3/UL (ref 1.7–8.2)
NEUTS SEG NFR BLD AUTO: 68.7 % (ref 42–78)
NITRITE UR QL STRIP: POSITIVE
PH UR STRIP: 5 [PH] (ref 5–9)
PLATELET # BLD: 186 10^3/UL (ref 150–450)
POTASSIUM SERPL-SCNC: 3.8 MMOL/L (ref 3.6–5)
PROT UR STRIP-MCNC: NEGATIVE MG/DL
RBC # BLD AUTO: 4.17 10^6/UL (ref 3.72–5.28)
SODIUM SERPL-SCNC: 146.2 MMOL/L (ref 137–145)
SP GR UR STRIP: 1.02
TOTAL CELLS COUNTED % (AUTO): 100 %
UROBILINOGEN UR-MCNC: NEGATIVE MG/DL (ref ?–2)
WBC # BLD AUTO: 6.4 10^3/UL (ref 4–10.5)

## 2018-04-26 NOTE — RADIOLOGY REPORT (SQ)
EXAM DESCRIPTION:  CHEST PA/LATERAL



COMPLETED DATE/TIME:  4/26/2018 10:38 am



REASON FOR STUDY:  PRE OP



COMPARISON:  12/6/2017



EXAM PARAMETERS:  NUMBER OF VIEWS: two views

TECHNIQUE: Digital Frontal and Lateral radiographic views of the chest acquired.

RADIATION DOSE: NA

LIMITATIONS: none



FINDINGS:  LUNGS AND PLEURA: No opacities, masses or pneumothorax. No pleural effusion.

MEDIASTINUM AND HILAR STRUCTURES: No masses or contour abnormalities.

HEART AND VASCULAR STRUCTURES: Heart normal size.  No evidence for failure.

BONES: No acute findings.

HARDWARE: Sternotomy wires, graft markers.

OTHER: No other significant finding.



IMPRESSION:  NO SIGNIFICANT RADIOGRAPHIC FINDING IN THE CHEST.



TECHNICAL DOCUMENTATION:  JOB ID:  [id number]

 2011 [employer]- All Rights Reserved



Reading location - IP/workstation name: [email or web address]

## 2018-04-26 NOTE — EKG REPORT
SEVERITY:- ABNORMAL ECG -

SINUS RHYTHM

INCOMPLETE RIGHT BUNDLE BRANCH BLOCK

BORDERLINE INFERIOR Q WAVES

:

Confirmed by: Jonh Moses MD 26-Apr-2018 13:44:25

## 2018-05-03 ENCOUNTER — HOSPITAL ENCOUNTER (OUTPATIENT)
Dept: HOSPITAL 62 - OROUT | Age: 68
Discharge: HOME | End: 2018-05-03
Attending: ORTHOPAEDIC SURGERY
Payer: COMMERCIAL

## 2018-05-03 VITALS — DIASTOLIC BLOOD PRESSURE: 89 MMHG | SYSTOLIC BLOOD PRESSURE: 155 MMHG

## 2018-05-03 DIAGNOSIS — I10: ICD-10-CM

## 2018-05-03 DIAGNOSIS — Z88.0: ICD-10-CM

## 2018-05-03 DIAGNOSIS — E03.9: ICD-10-CM

## 2018-05-03 DIAGNOSIS — Z79.891: ICD-10-CM

## 2018-05-03 DIAGNOSIS — Z88.2: ICD-10-CM

## 2018-05-03 DIAGNOSIS — Z01.818: ICD-10-CM

## 2018-05-03 DIAGNOSIS — M19.90: ICD-10-CM

## 2018-05-03 DIAGNOSIS — Z79.84: ICD-10-CM

## 2018-05-03 DIAGNOSIS — Z88.5: ICD-10-CM

## 2018-05-03 DIAGNOSIS — Z79.899: ICD-10-CM

## 2018-05-03 DIAGNOSIS — Z87.892: ICD-10-CM

## 2018-05-03 DIAGNOSIS — M75.122: Primary | ICD-10-CM

## 2018-05-03 DIAGNOSIS — E11.9: ICD-10-CM

## 2018-05-03 DIAGNOSIS — S46.112A: ICD-10-CM

## 2018-05-03 DIAGNOSIS — Z87.891: ICD-10-CM

## 2018-05-03 DIAGNOSIS — G47.33: ICD-10-CM
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
## 2018-05-03 LAB
APTT BLD: 35.6 SEC (ref 23.5–35.8)
GLUCOSE SERPL-MCNC: 92 MG/DL (ref 75–110)
INR PPP: 0.99
POTASSIUM SERPL-SCNC: 4 MMOL/L (ref 3.6–5)
PROTHROMBIN TIME: 13.6 SEC (ref 11.4–15.4)

## 2018-05-03 PROCEDURE — 71046 X-RAY EXAM CHEST 2 VIEWS: CPT

## 2018-05-03 PROCEDURE — 36415 COLL VENOUS BLD VENIPUNCTURE: CPT

## 2018-05-03 PROCEDURE — 82947 ASSAY GLUCOSE BLOOD QUANT: CPT

## 2018-05-03 PROCEDURE — 84132 ASSAY OF SERUM POTASSIUM: CPT

## 2018-05-03 PROCEDURE — C1713 ANCHOR/SCREW BN/BN,TIS/BN: HCPCS

## 2018-05-03 PROCEDURE — 29827 SHO ARTHRS SRG RT8TR CUF RPR: CPT

## 2018-05-03 PROCEDURE — 85610 PROTHROMBIN TIME: CPT

## 2018-05-03 PROCEDURE — 93005 ELECTROCARDIOGRAM TRACING: CPT

## 2018-05-03 PROCEDURE — 24340 TENODESIS BICEPS TDN AT ELBW: CPT

## 2018-05-03 PROCEDURE — 81001 URINALYSIS AUTO W/SCOPE: CPT

## 2018-05-03 PROCEDURE — 85730 THROMBOPLASTIN TIME PARTIAL: CPT

## 2018-05-03 PROCEDURE — 85025 COMPLETE CBC W/AUTO DIFF WBC: CPT

## 2018-05-03 PROCEDURE — 80048 BASIC METABOLIC PNL TOTAL CA: CPT

## 2018-05-03 PROCEDURE — 93010 ELECTROCARDIOGRAM REPORT: CPT

## 2018-05-03 RX ADMIN — FENTANYL CITRATE ONE MCG: 50 INJECTION INTRAMUSCULAR; INTRAVENOUS at 11:00

## 2018-05-03 RX ADMIN — FENTANYL CITRATE ONE MCG: 50 INJECTION INTRAMUSCULAR; INTRAVENOUS at 11:12

## 2018-05-03 NOTE — OPERATIVE REPORT
Operative Report


DATE OF SURGERY: 05/03/18


PREOPERATIVE DIAGNOSIS: Left shoulder full-thickness rotator cuff tear


POSTOPERATIVE DIAGNOSIS: Same plus partial tear of the long head of the biceps


OPERATION: Left shoulder arthroscopic rotator cuff repair and biceps tenodesis


SURGEON: KAREEM BEAR


ANESTHESIA: GA


TISSUE REMOVED OR ALTERED: Portion of the long head of the biceps


COMPLICATIONS: 





None


ESTIMATED BLOOD LOSS: 20 mL


INTRAOPERATIVE FINDINGS: As above


PROCEDURE: 





IMPLANTS: 5.5 by composite corkscrew 1, 4.75 mm by composite swivel locks 2








DESCRIPTION OF PROCEDURE: Patient was brought to the operating room placed in 

supine position.  After successfully induced and intubated the patient patient 

was placed in the beachchair position the head and endotracheal tube was 

secured appropriately.  The left shoulder was prepped and draped in a normal 

surgical fashion.  A timeout was done identifying the left shoulder as the 

correct site.  After inflating the glenohumeral joint with sterile saline 

solution an 11 blade was used to establish the posterior portal.  The 

arthroscope was introduced and return of fluid was seen showing that we 

successfully penetrated the glenohumeral joint.  With the use of spinal needle 

we're able to lesley the anterior portal and using an 11 blade able to establish 

anterior portal.  A cannula was introduced through the anterior portal.  At 

this point diagnostic scope was done.  At first glance patient had a type II 

SLAP tear and was probed showing the detachment.  Also when I turned my 

attention to the rotator cuff was evidence of showing the full-thickness 

rotator cuff tear.  The articulation was intact.  No loose bodies.


A lateral portal was established 11 blade.  Passport cannula was introduced to 

secure the lateral portal. 4.0mm shaver was introduced and was used to debride 

edges of the tear as well as bur the  bone for preparation of anchor placement.

  Once I was satisfied with the preparation I then redirected my scope into the 

subacromial space.  Formal bursectomy was done using radiofrequency ablator and 

4.0 mm shaver to expose the rotator cuff tear.


A percutaneous incision was then just adjacent to the acromion on the lateral 

aspect.  Through this percutaneous hole the awl was used to prepare the hole 

for an anchor.  [place] was percutaneously sent flushed with the bone just 

adjacent to the articular margin.  Sutures were passed through the anterior 

portal for proper suture management.  With the use of the scorpion and I 

proceeded to pass the sutures through the rotator cuff tendon with proper 

suture management was able to pass the strands either through percutaneous hole 

or the anterior portal.  Once I was satisfied with placement of all my sutures 

I then proceeded to do my arthroscopic knots.  At this point the strands were 

used to do our lateral row.  2 bicomposite swivel lock  were used for the 

lateral row fixation. Lateral aspect of the humerus was then cleaned off with a 

shaver and electrocautery.  Once identified placementof the swivel lock, I 

proceeded to use my awl to do my hole.  This this point the sutures were 

adequately tensioned and secured. Swivel lock was inserted and screwed in,  

securing and increasing the footprint of the rotator cuff repair.  Remaining 

strands were cut with the arthroscopic cutter.    Final pictures were taking 

showing my repair.  At this point fluid from the shoulder was removed camera 

and instruments were all removed.  I turned my attention to the subpectoralis 

biceps tenodesis portion of the case.  I used a 15 blade and did a inch and a 

half incision on the anterior aspect of the arm adjacent to the axillary fold.  

I used electrocautery to obtain hemostasis of the subcutaneous tissue bleed.  I 

used Metzenbaum scissors and 2 pierced and split the fascial tissue covering 

the biceps and deltoid.  I used then combination of my finger and 90 clamp to 

palpate the long head of biceps and bicipital groove and capturing hook the 

tendon that was tenotomized.  This was pulled through my incision successfully.

  I used a fiber loop to then secure my muscular tendinous portion which was 

approximately 2 cm.  The remaining biceps was cut and discarded.  The loop was, 

and the 2 strands were then fed through the tenodesis button.  These were 

secured and then I proceeded to use Homans to expose the side of the humeral 

shaft and was able to used a 4 mm spade tip guidepin to drill the anterior 

cortex.  This was removed and then the loaded button was then inserted and then 

removing the disposable portion was able to flip the button and securing the 

biceps onto the anterior cortex of the humerus.  Half hitch knots were done to 

further secure the biceps.  Remaining strand was cut with a fiber wire 

scissors.  I used 0 Vicryl to approximate the subcutaneous tissue.  I used 3-0 

nylon to do closure of my incision.  I proceeded to close my portal sites with 3

-0 nylon.  Xeroform 4 x 4 dressing followed by ABDs pads and Medipore tape was 

applied.  Patient was placed in a sling and returned to supine position where 

he was successfully extubated and taken to PACU in stable condition.

## 2018-05-03 NOTE — DISCHARGE SUMMARY
Discharge Summary (SDC)





- Discharge


Final Diagnosis: 





Left shoulder arthroscopic rotator cuff repair and biceps tenodesis


Date of Surgery: 05/03/18


Discharge Date: 05/03/18


Condition: Good


Treatment or Instructions: 


Patient is instructed to follow up in 10-14 days.


Patient instructed to remove dressing in 4 days then can shower and apply Band-

Aids as needed.


Patient to wear sling for comfort but okay to remove for shower and pendulum 

exercises.


Pendulum exercises are instructed to be done 3 times a day ideally with 

breakfast, lunch, dinners and showers.


Patient instructed to call if there is any signs of redness or drainage fevers 

or chills.


Prescriptions: 


Oxycodone HCl/Acetaminophen [Percocet 5-325 mg Tablet] 1 - 2 tab PO ASDIR PRN #

25 tablet


 PRN Reason: 


Referrals: 


AMAYA ANGELES DO [Primary Care Provider] - 


Discharge Diet: As Tolerated


Respiratory Treatments at Home: Deep Breathing/Coughing


Discharge Activity: No Driving, No Lifting/Push/Pulling, Slowly Increase 

Activity, Walk Frequently


Home Care Assistance: None Needed


Report the Following to Your Physician Immediately: Shortness of Breath, 

Vomiting, Increase in Pain, Fever over 101 Degrees, Unusual Bleeding, Redness, 

Swelling, Warmth, Increased Soreness, Drainage-Yellow, Drainage-Gray, Drainage-

Green

## 2018-08-28 ENCOUNTER — HOSPITAL ENCOUNTER (OUTPATIENT)
Dept: HOSPITAL 62 - OD | Age: 68
End: 2018-08-28
Attending: ORTHOPAEDIC SURGERY
Payer: COMMERCIAL

## 2018-08-28 DIAGNOSIS — Z53.9: Primary | ICD-10-CM

## 2018-09-24 ENCOUNTER — HOSPITAL ENCOUNTER (EMERGENCY)
Dept: HOSPITAL 62 - ER | Age: 68
Discharge: HOME | End: 2018-09-24
Payer: COMMERCIAL

## 2018-09-24 VITALS — SYSTOLIC BLOOD PRESSURE: 208 MMHG | DIASTOLIC BLOOD PRESSURE: 90 MMHG

## 2018-09-24 DIAGNOSIS — Z88.6: ICD-10-CM

## 2018-09-24 DIAGNOSIS — E11.9: ICD-10-CM

## 2018-09-24 DIAGNOSIS — I10: Primary | ICD-10-CM

## 2018-09-24 DIAGNOSIS — Z95.1: ICD-10-CM

## 2018-09-24 DIAGNOSIS — Z90.49: ICD-10-CM

## 2018-09-24 DIAGNOSIS — Z88.2: ICD-10-CM

## 2018-09-24 DIAGNOSIS — E03.9: ICD-10-CM

## 2018-09-24 DIAGNOSIS — Z88.0: ICD-10-CM

## 2018-09-24 LAB
ADD MANUAL DIFF: NO
ANION GAP SERPL CALC-SCNC: 4 MMOL/L (ref 5–19)
BASOPHILS # BLD AUTO: 0 10^3/UL (ref 0–0.2)
BASOPHILS NFR BLD AUTO: 0.3 % (ref 0–2)
BUN SERPL-MCNC: 12 MG/DL (ref 7–20)
CALCIUM: 8.9 MG/DL (ref 8.4–10.2)
CHLORIDE SERPL-SCNC: 103 MMOL/L (ref 98–107)
CO2 SERPL-SCNC: 34 MMOL/L (ref 22–30)
EOSINOPHIL # BLD AUTO: 0.1 10^3/UL (ref 0–0.6)
EOSINOPHIL NFR BLD AUTO: 2.7 % (ref 0–6)
ERYTHROCYTE [DISTWIDTH] IN BLOOD BY AUTOMATED COUNT: 15.2 % (ref 11.5–14)
GLUCOSE SERPL-MCNC: 93 MG/DL (ref 75–110)
HCT VFR BLD CALC: 40.5 % (ref 36–47)
HGB BLD-MCNC: 14 G/DL (ref 12–15.5)
LYMPHOCYTES # BLD AUTO: 1.5 10^3/UL (ref 0.5–4.7)
LYMPHOCYTES NFR BLD AUTO: 30.6 % (ref 13–45)
MCH RBC QN AUTO: 32.2 PG (ref 27–33.4)
MCHC RBC AUTO-ENTMCNC: 34.6 G/DL (ref 32–36)
MCV RBC AUTO: 93 FL (ref 80–97)
MONOCYTES # BLD AUTO: 0.6 10^3/UL (ref 0.1–1.4)
MONOCYTES NFR BLD AUTO: 11.3 % (ref 3–13)
NEUTROPHILS # BLD AUTO: 2.8 10^3/UL (ref 1.7–8.2)
NEUTS SEG NFR BLD AUTO: 55.1 % (ref 42–78)
PLATELET # BLD: 178 10^3/UL (ref 150–450)
POTASSIUM SERPL-SCNC: 4.1 MMOL/L (ref 3.6–5)
RBC # BLD AUTO: 4.35 10^6/UL (ref 3.72–5.28)
SODIUM SERPL-SCNC: 141 MMOL/L (ref 137–145)
TOTAL CELLS COUNTED % (AUTO): 100 %
WBC # BLD AUTO: 5.1 10^3/UL (ref 4–10.5)

## 2018-09-24 PROCEDURE — 93005 ELECTROCARDIOGRAM TRACING: CPT

## 2018-09-24 PROCEDURE — 93010 ELECTROCARDIOGRAM REPORT: CPT

## 2018-09-24 PROCEDURE — 80048 BASIC METABOLIC PNL TOTAL CA: CPT

## 2018-09-24 PROCEDURE — 99284 EMERGENCY DEPT VISIT MOD MDM: CPT

## 2018-09-24 PROCEDURE — 85025 COMPLETE CBC W/AUTO DIFF WBC: CPT

## 2018-09-24 PROCEDURE — 36415 COLL VENOUS BLD VENIPUNCTURE: CPT

## 2018-09-24 NOTE — ER DOCUMENT REPORT
ED General





- General


Chief Complaint: High Blood Pressure


Stated Complaint: BLOOD PRESSURE ISSUES


Time Seen by Provider: 09/24/18 16:08


TRAVEL OUTSIDE OF THE [place] IN LAST 30 DAYS: No





- Related Data


Allergies/Adverse Reactions: 


 





cefuroxime axetil [From Ceftin] Allergy (Verified 09/24/18 14:54)


 Anaphylaxis


codeine [Codeine] Allergy (Verified 09/24/18 14:54)


 Tachycardia


hydrocodone [Hydrocodone] Allergy (Verified 09/24/18 14:54)


 Tachycardia


Penicillins Allergy (Verified 09/24/18 14:54)


 Anaphylaxis


prochlorperazine edisylate [From Compazine] Allergy (Verified 09/24/18 14:54)


 Jaundice


prochlorperazine maleate [From Compazine] Allergy (Verified 09/24/18 14:54)


 Jaundice


Sulfa (Sulfonamide Antibiotics) Allergy (Verified 09/24/18 14:54)


 rash











Past Medical History





- Social History


Smoking Status: Former Smoker


Frequency of alcohol use: Rare


Drug Abuse: None


Family History: Reviewed & Not Pertinent


Patient has suicidal ideation: No


Patient has homicidal ideation: No





- Past Medical History


Cardiac Medical History: Reports: Hx Coronary Artery Disease - CABG x4, Hx 

Hypercholesterolemia, Hx Hypertension


   Denies: Hx Heart Attack


Pulmonary Medical History: 


   Denies: Hx Asthma, Hx Bronchitis, Hx COPD, Hx Pneumonia


Neurological Medical History: Reports: Hx Migraine.  Denies: Hx Cerebrovascular 

Accident, Hx Seizures


Endocrine Medical History: Reports: Hx Hypothyroidism


Renal/ Medical History: Denies: Hx Peritoneal Dialysis


Musculoskeletal Medical History: Reports Hx Arthritis - LULU. HANDS/KNEES/FEET


Psychiatric Medical History: Reports: Hx Anxiety


Past Surgical History: Reports: Hx Appendectomy, Hx Cardiac Surgery - Bypass, 

Hx Cholecystectomy, Hx Orthopedic Surgery - right knee surgery, Hx Tonsillectomy





- Immunizations


Immunizations up to date: Yes


Hx Diphtheria, Pertussis, Tetanus Vaccination: Yes


Hx Pneumococcal Vaccination: 11/01/13





Physical Exam





- Vital signs


Vitals: 





 











Temp Pulse Resp BP Pulse Ox


 


 98.2 F   50 L  12   210/82 H  97 


 


 09/24/18 15:09  09/24/18 15:09  09/24/18 15:09  09/24/18 15:09  09/24/18 15:09














Course





- Vital Signs


Vital signs: 





 











Temp Pulse Resp BP Pulse Ox


 


 98.3 F   46 L  16   190/84 H  98 


 


 09/24/18 16:06  09/24/18 16:06  09/24/18 16:06  09/24/18 16:06  09/24/18 16:06














- Laboratory


Result Diagrams: 


 09/24/18 16:25





 09/24/18 16:25


Laboratory results interpreted by me: 





 











  09/24/18 09/24/18





  16:25 16:25


 


RDW  15.2 H 


 


Carbon Dioxide   34 H


 


Anion Gap   4 L














Discharge





- Discharge


Clinical Impression: 


Hypertension


Qualifiers:


 Hypertension type: essential hypertension Qualified Code(s): I10 - Essential (

primary) hypertension





Instructions:  High Blood Pressure, Requiring Treatment ([facility])


Additional Instructions: 


Your laboratory studies today do not show any significant pathology for your 

elevated blood pressure.  Would highly recommend that she take the increased 

dose of lisinopril and continue her other blood pressure medications as 

prescribed.  Return to ER symptoms worsen follow-up with your PCP for further 

management of your blood pressure.


Prescriptions: 


Lisinopril [Prinivil] 20 mg PO DAILY #30 tablet

## 2018-09-24 NOTE — ER DOCUMENT REPORT
ED General





- General


Chief Complaint: High Blood Pressure


Stated Complaint: BLOOD PRESSURE ISSUES


Time Seen by Provider: 09/24/18 16:08


Mode of Arrival: Ambulatory


Information source: Patient


Notes: 





This is a 68-year-old female with a history of hypertension, dm and coronary 

artery disease presents to the emergency room with increased blood pressure of 

late.  Patient denies headache, chest pain, shortness of breath or weakness or 

slurred speech.  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 

that her blood pressure is been increasing.  She did try and get into her 

primary care doctor's office today but was unable to.


TRAVEL OUTSIDE OF THE [place] IN LAST 30 DAYS: No





- HPI


Onset: Last week


Onset/Duration: Gradual


Quality of pain: No pain


Severity: None


Pain Level: Denies


Associated symptoms: denies: Chest pain, Fever, Shortness of breath


Exacerbated by: Denies


Relieved by: Denies


Similar symptoms previously: No


Recently seen / treated by doctor: No





- Related Data


Allergies/Adverse Reactions: 


 





cefuroxime axetil [From Ceftin] Allergy (Verified 09/24/18 14:54)


 Anaphylaxis


codeine [Codeine] Allergy (Verified 09/24/18 14:54)


 Tachycardia


hydrocodone [Hydrocodone] Allergy (Verified 09/24/18 14:54)


 Tachycardia


Penicillins Allergy (Verified 09/24/18 14:54)


 Anaphylaxis


prochlorperazine edisylate [From Compazine] Allergy (Verified 09/24/18 14:54)


 Jaundice


prochlorperazine maleate [From Compazine] Allergy (Verified 09/24/18 14:54)


 Jaundice


Sulfa (Sulfonamide Antibiotics) Allergy (Verified 09/24/18 14:54)


 rash











Past Medical History





- General


Information source: Patient





- Social History


Smoking Status: Former Smoker


Cigarette use (# per day): No


Chew tobacco use (# tins/day): No


Frequency of alcohol use: Rare


Drug Abuse: None


Lives with: Family


Family History: Reviewed & Not Pertinent


Patient has suicidal ideation: No


Patient has homicidal ideation: No





- Past Medical History


Cardiac Medical History: Reports: Hx Coronary Artery Disease - CABG x4, Hx 

Hypercholesterolemia, Hx Hypertension


   Denies: Hx Heart Attack


Pulmonary Medical History: 


   Denies: Hx Asthma, Hx Bronchitis, Hx COPD, Hx Pneumonia


Neurological Medical History: Reports: Hx Migraine.  Denies: Hx Cerebrovascular 

Accident, Hx Seizures


Endocrine Medical History: Reports: Hx Hypothyroidism


Renal/ Medical History: Denies: Hx Peritoneal Dialysis


Musculoskeletal Medical History: Reports Hx Arthritis - LULU. HANDS/KNEES/FEET


Psychiatric Medical History: Reports: Hx Anxiety


Past Surgical History: Reports: Hx Appendectomy, Hx Cardiac Surgery - Bypass, 

Hx Cholecystectomy, Hx Orthopedic Surgery - right knee surgery, Hx Tonsillectomy





- Immunizations


Immunizations up to date: Yes


Hx Diphtheria, Pertussis, Tetanus Vaccination: Yes


Hx Pneumococcal Vaccination: 11/01/13





Review of Systems





- Review of Systems


Constitutional: denies: Chills, Fever


EENT: No symptoms reported


Cardiovascular: See HPI.  denies: Chest pain, Palpitations, Heart racing


Respiratory: denies: Hemoptysis, Short of breath, Wheezing


Gastrointestinal: No symptoms reported


Genitourinary: No symptoms reported


Female Genitourinary: No symptoms reported


Musculoskeletal: No symptoms reported


Skin: No symptoms reported


Hematologic/Lymphatic: No symptoms reported


Neurological/Psychological: denies: Weakness, Headaches, Speech impairment, 

Numbness





Physical Exam





- Vital signs


Vitals: 


 











Temp Pulse Resp BP Pulse Ox


 


 98.2 F   50 L  12   210/82 H  97 


 


 09/24/18 15:09  09/24/18 15:09 09/24/18 15:09  09/24/18 15:09 09/24/18 15:09











Notes: 





Physical exam:


 


GENERAL: 68-year-old female, alert and oriented 3, no acute distress.  Blood 

pressure is 201/100.  She denies any chest pain, shortness of breath or 

headache.





HEAD: Atraumatic, normocephalic.





EYES: Pupils equal round and reactive to light, extraocular movements intact, 

sclera anicteric, conjunctiva are normal.





ENT: TMs normal, nares patent, oropharynx clear without exudates.  Moist mucous 

membranes.





NECK: Normal range of motion, supple without obvious mass or JVD.





LUNGS: Breath sounds clear to auscultation bilaterally and equal.  No wheezes 

rales or rhonchi.





HEART: Regular rate and rhythm without murmurs, rubs or gallops.





ABDOMEN: Soft, normoactive bowel sounds.  No tenderness to palpation.  No 

guarding, no rebound.  No masses appreciated.





EXTREMITIES: Normal range of motion, no pitting or edema.  No clubbing or 

cyanosis.





NEUROLOGICAL: Cranial nerves II through XII grossly intact.  Normal speech, 

moving all extremities, motor 5/5, sensory grossly intact, cerebellar (finger 

to nose is good).





PSYCH: Normal mood, normal affect.





SKIN: Warm, Dry, normal turgor, no rashes or lesions noted.





Course





- Re-evaluation


Re-evalutation: 





09/24/18 21:13


I have had a long discussion with the patient regarding her blood pressure 

regimen is and is following up with her doctors.  She will take the Lasix 

regularly.  She will increase the lisinopril.  She will monitor her blood 

pressure, heart rate and weight for the next week.





- Vital Signs


Vital signs: 


 











Temp Pulse Resp BP Pulse Ox


 


 98.3 F   50 L  21 H  208/90 H  98 


 


 09/24/18 18:07  09/24/18 18:07  09/24/18 20:31  09/24/18 20:31  09/24/18 20:31














- Laboratory


Result Diagrams: 


 09/24/18 16:25





 09/24/18 16:25


Laboratory results interpreted by me: 


 











  09/24/18 09/24/18





  16:25 16:25


 


RDW  15.2 H 


 


Carbon Dioxide   34 H


 


Anion Gap   4 L














- EKG Interpretation by Me


Rate: Normal


Rhythm: NSR - EKG shows sinus bradycardia with a ventricular rate of 51.  There 

is a right bundle type pattern, LVH, no acute change from previous





Discharge





- Discharge


Clinical Impression: 


Hypertension


Qualifiers:


 Hypertension type: essential hypertension Qualified Code(s): I10 - Essential (

primary) hypertension





Condition: Stable


Disposition: HOME, SELF-CARE


Instructions:  High Blood Pressure, Requiring Treatment ([facility])


Additional Instructions: 


As we discussed, your labs look good.  Your EKG looked good.  The goal is to 

reduce the blood pressure in a controlled fashion.


I would like you to increase the lisinopril to 10 mg daily.


Start taking the Lasix daily


Check your blood pressure twice daily along with a heart rate and your weight.


Assessment of the heart rate will allow us to adjust your metoprolol.


Assessing the weight may give us information on your fluid status which may 

help with the Lasix.


I would like you to follow-up with your doctor tomorrow.  Return to the 

emergency room


For headaches, chest pain, shortness of breath or any concerns or getting worse 

or if the blood pressure is staying elevated.





Prescriptions: 


Lisinopril 10 mg PO DAILY #30 tablet


Referrals: 


AMAYA ANGELES DO [Primary Care Provider] - Follow up tomorrow


ELZA BUCHANAN MD [ACTIVE STAFF] - Follow up tomorrow

## 2018-09-24 NOTE — EKG REPORT
SEVERITY:- ABNORMAL ECG -

SINUS RHYTHM

INCOMPLETE RIGHT BUNDLE BRANCH BLOCK

PROBABLE LEFT VENTRICULAR HYPERTROPHY

:

Confirmed by: Gwen Norwood MD 24-Sep-2018 21:56:44
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
## 2018-11-14 ENCOUNTER — HOSPITAL ENCOUNTER (OUTPATIENT)
Dept: HOSPITAL 62 - SC | Age: 68
Discharge: HOME | End: 2018-11-14
Attending: OPHTHALMOLOGY
Payer: COMMERCIAL

## 2018-11-14 DIAGNOSIS — E03.9: ICD-10-CM

## 2018-11-14 DIAGNOSIS — Z79.02: ICD-10-CM

## 2018-11-14 DIAGNOSIS — I10: ICD-10-CM

## 2018-11-14 DIAGNOSIS — Z79.82: ICD-10-CM

## 2018-11-14 DIAGNOSIS — E78.00: ICD-10-CM

## 2018-11-14 DIAGNOSIS — Z79.84: ICD-10-CM

## 2018-11-14 DIAGNOSIS — Z79.891: ICD-10-CM

## 2018-11-14 DIAGNOSIS — Z88.2: ICD-10-CM

## 2018-11-14 DIAGNOSIS — E66.9: ICD-10-CM

## 2018-11-14 DIAGNOSIS — H25.12: Primary | ICD-10-CM

## 2018-11-14 DIAGNOSIS — Z88.0: ICD-10-CM

## 2018-11-14 DIAGNOSIS — E11.9: ICD-10-CM

## 2018-11-14 DIAGNOSIS — Z88.5: ICD-10-CM

## 2018-11-14 DIAGNOSIS — M19.90: ICD-10-CM

## 2018-11-14 DIAGNOSIS — Z79.899: ICD-10-CM

## 2018-11-14 PROCEDURE — 82962 GLUCOSE BLOOD TEST: CPT

## 2018-11-14 PROCEDURE — 66984 XCAPSL CTRC RMVL W/O ECP: CPT

## 2018-11-14 PROCEDURE — V2630 ANTER CHAMBER INTRAOCUL LENS: HCPCS

## 2018-11-14 RX ADMIN — CYCLOPENTOLATE HYDROCHLORIDE AND PHENYLEPHRINE HYDROCHLORIDE PRN DROP: 2; 10 SOLUTION/ DROPS OPHTHALMIC at 09:53

## 2018-11-14 RX ADMIN — BESIFLOXACIN PRN DROP: 6 SUSPENSION OPHTHALMIC at 10:22

## 2018-11-14 RX ADMIN — BESIFLOXACIN PRN DROP: 6 SUSPENSION OPHTHALMIC at 09:35

## 2018-11-14 RX ADMIN — TETRACAINE HYDROCHLORIDE PRN DROP: 25 LIQUID OPHTHALMIC at 09:12

## 2018-11-14 RX ADMIN — BESIFLOXACIN PRN DROP: 6 SUSPENSION OPHTHALMIC at 09:11

## 2018-11-14 RX ADMIN — CYCLOPENTOLATE HYDROCHLORIDE AND PHENYLEPHRINE HYDROCHLORIDE PRN DROP: 2; 10 SOLUTION/ DROPS OPHTHALMIC at 09:10

## 2018-11-14 RX ADMIN — TROPICAMIDE PRN DROP: 10 SOLUTION/ DROPS OPHTHALMIC at 09:35

## 2018-11-14 RX ADMIN — TROPICAMIDE PRN DROP: 10 SOLUTION/ DROPS OPHTHALMIC at 09:53

## 2018-11-14 RX ADMIN — CYCLOPENTOLATE HYDROCHLORIDE AND PHENYLEPHRINE HYDROCHLORIDE PRN DROP: 2; 10 SOLUTION/ DROPS OPHTHALMIC at 09:35

## 2018-11-14 RX ADMIN — TETRACAINE HYDROCHLORIDE PRN DROP: 25 LIQUID OPHTHALMIC at 09:53

## 2018-11-14 RX ADMIN — TETRACAINE HYDROCHLORIDE PRN DROP: 25 LIQUID OPHTHALMIC at 09:58

## 2018-11-14 RX ADMIN — TROPICAMIDE PRN DROP: 10 SOLUTION/ DROPS OPHTHALMIC at 09:10

## 2018-11-28 ENCOUNTER — HOSPITAL ENCOUNTER (OUTPATIENT)
Dept: HOSPITAL 62 - SC | Age: 68
Discharge: HOME | End: 2018-11-28
Attending: OPHTHALMOLOGY
Payer: COMMERCIAL

## 2018-11-28 DIAGNOSIS — H25.11: Primary | ICD-10-CM

## 2018-11-28 PROCEDURE — 82962 GLUCOSE BLOOD TEST: CPT

## 2018-11-28 PROCEDURE — 66984 XCAPSL CTRC RMVL W/O ECP: CPT

## 2018-11-28 PROCEDURE — V2630 ANTER CHAMBER INTRAOCUL LENS: HCPCS

## 2018-11-28 RX ADMIN — TETRACAINE HYDROCHLORIDE PRN DROP: 25 LIQUID OPHTHALMIC at 10:00

## 2018-11-28 RX ADMIN — CYCLOPENTOLATE HYDROCHLORIDE AND PHENYLEPHRINE HYDROCHLORIDE PRN DROP: 2; 10 SOLUTION/ DROPS OPHTHALMIC at 09:46

## 2018-11-28 RX ADMIN — HEPARIN SODIUM ONE ML: 1000 INJECTION, SOLUTION INTRAVENOUS; SUBCUTANEOUS at 10:17

## 2018-11-28 RX ADMIN — BESIFLOXACIN PRN DROP: 6 SUSPENSION OPHTHALMIC at 09:36

## 2018-11-28 RX ADMIN — SODIUM CHONDROITIN SULFATE / SODIUM HYALURONATE ONE EACH: 0.55-0.5 INJECTION INTRAOCULAR at 10:17

## 2018-11-28 RX ADMIN — CYCLOPENTOLATE HYDROCHLORIDE AND PHENYLEPHRINE HYDROCHLORIDE PRN DROP: 2; 10 SOLUTION/ DROPS OPHTHALMIC at 09:58

## 2018-11-28 RX ADMIN — TROPICAMIDE PRN DROP: 10 SOLUTION/ DROPS OPHTHALMIC at 09:58

## 2018-11-28 RX ADMIN — CYCLOPENTOLATE HYDROCHLORIDE AND PHENYLEPHRINE HYDROCHLORIDE PRN DROP: 2; 10 SOLUTION/ DROPS OPHTHALMIC at 09:36

## 2018-11-28 RX ADMIN — TETRACAINE HYDROCHLORIDE PRN DROP: 25 LIQUID OPHTHALMIC at 09:35

## 2018-11-28 RX ADMIN — BESIFLOXACIN PRN DROP: 6 SUSPENSION OPHTHALMIC at 09:47

## 2018-11-28 RX ADMIN — BESIFLOXACIN PRN DROP: 6 SUSPENSION OPHTHALMIC at 10:28

## 2018-11-28 RX ADMIN — TROPICAMIDE PRN DROP: 10 SOLUTION/ DROPS OPHTHALMIC at 09:46

## 2018-11-28 RX ADMIN — BESIFLOXACIN PRN DROP: 6 SUSPENSION OPHTHALMIC at 00:00

## 2018-11-28 RX ADMIN — EPINEPHRINE ONE MG: 1 INJECTION, SOLUTION, CONCENTRATE INTRAVENOUS at 00:00

## 2018-11-28 RX ADMIN — TOBRAMYCIN AND DEXAMETHASONE ONE APPLIC: 3; 1 OINTMENT OPHTHALMIC at 10:28

## 2018-11-28 RX ADMIN — TROPICAMIDE PRN DROP: 10 SOLUTION/ DROPS OPHTHALMIC at 09:36

## 2018-11-28 RX ADMIN — HEPARIN SODIUM ONE ML: 1000 INJECTION, SOLUTION INTRAVENOUS; SUBCUTANEOUS at 00:00

## 2018-11-28 RX ADMIN — EPINEPHRINE ONE MG: 1 INJECTION, SOLUTION, CONCENTRATE INTRAVENOUS at 10:17

## 2018-11-28 RX ADMIN — TETRACAINE HYDROCHLORIDE PRN DROP: 25 LIQUID OPHTHALMIC at 00:00

## 2018-11-28 RX ADMIN — TETRACAINE HYDROCHLORIDE PRN DROP: 25 LIQUID OPHTHALMIC at 10:04

## 2018-11-28 RX ADMIN — SODIUM CHONDROITIN SULFATE / SODIUM HYALURONATE ONE EACH: 0.55-0.5 INJECTION INTRAOCULAR at 00:00

## 2018-11-28 RX ADMIN — TOBRAMYCIN AND DEXAMETHASONE ONE APPLIC: 3; 1 OINTMENT OPHTHALMIC at 00:00

## 2018-12-27 ENCOUNTER — HOSPITAL ENCOUNTER (EMERGENCY)
Dept: HOSPITAL 62 - ER | Age: 68
Discharge: HOME | End: 2018-12-27
Payer: COMMERCIAL

## 2018-12-27 VITALS — DIASTOLIC BLOOD PRESSURE: 72 MMHG | SYSTOLIC BLOOD PRESSURE: 144 MMHG
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
## 2018-12-27 DIAGNOSIS — Z95.1: ICD-10-CM

## 2018-12-27 DIAGNOSIS — M79.641: ICD-10-CM

## 2018-12-27 DIAGNOSIS — M25.531: Primary | ICD-10-CM

## 2018-12-27 DIAGNOSIS — M67.40: ICD-10-CM

## 2018-12-27 DIAGNOSIS — I25.10: ICD-10-CM

## 2018-12-27 DIAGNOSIS — I10: ICD-10-CM

## 2018-12-27 PROCEDURE — 99283 EMERGENCY DEPT VISIT LOW MDM: CPT
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
## 2019-01-17 LAB
ANION GAP SERPL CALC-SCNC: 6 MMOL/L (ref 5–19)
APPEARANCE UR: (no result)
APTT PPP: YELLOW S
BILIRUB UR QL STRIP: NEGATIVE
BUN SERPL-MCNC: 14 MG/DL (ref 7–20)
CALCIUM: 9.2 MG/DL (ref 8.4–10.2)
CHLORIDE SERPL-SCNC: 105 MMOL/L (ref 98–107)
CO2 SERPL-SCNC: 31 MMOL/L (ref 22–30)
ERYTHROCYTE [DISTWIDTH] IN BLOOD BY AUTOMATED COUNT: 14.5 % (ref 11.5–14)
GLUCOSE SERPL-MCNC: 94 MG/DL (ref 75–110)
GLUCOSE UR STRIP-MCNC: NEGATIVE MG/DL
HCT VFR BLD CALC: 41.1 % (ref 36–47)
HGB BLD-MCNC: 14.5 G/DL (ref 12–15.5)
KETONES UR STRIP-MCNC: NEGATIVE MG/DL
MCH RBC QN AUTO: 33.5 PG (ref 27–33.4)
MCHC RBC AUTO-ENTMCNC: 35.3 G/DL (ref 32–36)
MCV RBC AUTO: 95 FL (ref 80–97)
NITRITE UR QL STRIP: NEGATIVE
PH UR STRIP: 5 [PH] (ref 5–9)
PLATELET # BLD: 186 10^3/UL (ref 150–450)
POTASSIUM SERPL-SCNC: 4.4 MMOL/L (ref 3.6–5)
PROT UR STRIP-MCNC: 30 MG/DL
RBC # BLD AUTO: 4.33 10^6/UL (ref 3.72–5.28)
SODIUM SERPL-SCNC: 142.4 MMOL/L (ref 137–145)
SP GR UR STRIP: 1.02
UROBILINOGEN UR-MCNC: 2 MG/DL (ref ?–2)
WBC # BLD AUTO: 5.8 10^3/UL (ref 4–10.5)

## 2019-01-17 NOTE — EKG REPORT
SEVERITY:- ABNORMAL ECG -

SINUS RHYTHM

INCOMPLETE RIGHT BUNDLE BRANCH BLOCK

PROBABLE LVH WITH SECONDARY REPOL ABNRM

BORDERLINE INFERIOR Q WAVES

:

Confirmed by: Pino Nicole 17-Jan-2019 10:25:13

## 2019-01-17 NOTE — RADIOLOGY REPORT (SQ)
EXAM DESCRIPTION:  CHEST PA/LATERAL



COMPLETED DATE/TIME:  1/17/2019 10:45 am



REASON FOR STUDY:  PRE-OP



COMPARISON:  12/26/2012.



EXAM PARAMETERS:  NUMBER OF VIEWS: two views

TECHNIQUE: Digital Frontal and Lateral radiographic views of the chest acquired.

RADIATION DOSE: NA

LIMITATIONS: none



FINDINGS:  LUNGS AND PLEURA: Mild chronic interstitial changes.  No opacities, masses or pneumothorax
. No pleural effusion.

MEDIASTINUM AND HILAR STRUCTURES: No masses or contour abnormalities.

HEART AND VASCULAR STRUCTURES: Heart normal size.  No evidence for failure.

BONES: No acute findings.

HARDWARE: Sternotomy wires and coronary bypass markers.

OTHER: No other significant finding.



IMPRESSION:  NO ACUTE RADIOGRAPHIC FINDING IN THE CHEST.



TECHNICAL DOCUMENTATION:  JOB ID:  [id number]

 2011 [employer]- All Rights Reserved



Reading location - IP/workstation name: [email or web address]

## 2019-01-24 ENCOUNTER — HOSPITAL ENCOUNTER (OUTPATIENT)
Dept: HOSPITAL 62 - OROUT | Age: 69
Discharge: HOME | End: 2019-01-24
Attending: ORTHOPAEDIC SURGERY
Payer: COMMERCIAL

## 2019-01-24 VITALS — DIASTOLIC BLOOD PRESSURE: 77 MMHG | SYSTOLIC BLOOD PRESSURE: 142 MMHG

## 2019-01-24 DIAGNOSIS — Z88.2: ICD-10-CM

## 2019-01-24 DIAGNOSIS — Z95.1: ICD-10-CM

## 2019-01-24 DIAGNOSIS — Z79.82: ICD-10-CM

## 2019-01-24 DIAGNOSIS — Z88.0: ICD-10-CM

## 2019-01-24 DIAGNOSIS — I25.10: ICD-10-CM

## 2019-01-24 DIAGNOSIS — Z79.84: ICD-10-CM

## 2019-01-24 DIAGNOSIS — Z79.899: ICD-10-CM

## 2019-01-24 DIAGNOSIS — Z88.8: ICD-10-CM

## 2019-01-24 DIAGNOSIS — Z88.5: ICD-10-CM

## 2019-01-24 DIAGNOSIS — M19.90: ICD-10-CM

## 2019-01-24 DIAGNOSIS — E66.9: ICD-10-CM

## 2019-01-24 DIAGNOSIS — I10: ICD-10-CM

## 2019-01-24 DIAGNOSIS — M75.121: Primary | ICD-10-CM

## 2019-01-24 DIAGNOSIS — I45.10: ICD-10-CM

## 2019-01-24 DIAGNOSIS — E03.9: ICD-10-CM

## 2019-01-24 DIAGNOSIS — Z88.1: ICD-10-CM

## 2019-01-24 LAB
APTT BLD: 33.1 SEC (ref 23.5–35.8)
GLUCOSE SERPL-MCNC: 95 MG/DL (ref 75–110)
INR PPP: 0.96
POTASSIUM SERPL-SCNC: 4.3 MMOL/L (ref 3.6–5)
PROTHROMBIN TIME: 13.3 SEC (ref 11.4–15.4)

## 2019-01-24 PROCEDURE — 93005 ELECTROCARDIOGRAM TRACING: CPT

## 2019-01-24 PROCEDURE — 71046 X-RAY EXAM CHEST 2 VIEWS: CPT

## 2019-01-24 PROCEDURE — C1713 ANCHOR/SCREW BN/BN,TIS/BN: HCPCS

## 2019-01-24 PROCEDURE — 29824 SHO ARTHRS SRG DSTL CLAVICLC: CPT

## 2019-01-24 PROCEDURE — 85730 THROMBOPLASTIN TIME PARTIAL: CPT

## 2019-01-24 PROCEDURE — 93010 ELECTROCARDIOGRAM REPORT: CPT

## 2019-01-24 PROCEDURE — 85610 PROTHROMBIN TIME: CPT

## 2019-01-24 PROCEDURE — 36415 COLL VENOUS BLD VENIPUNCTURE: CPT

## 2019-01-24 PROCEDURE — 80048 BASIC METABOLIC PNL TOTAL CA: CPT

## 2019-01-24 PROCEDURE — 85027 COMPLETE CBC AUTOMATED: CPT

## 2019-01-24 PROCEDURE — 84132 ASSAY OF SERUM POTASSIUM: CPT

## 2019-01-24 PROCEDURE — 82947 ASSAY GLUCOSE BLOOD QUANT: CPT

## 2019-01-24 PROCEDURE — 29827 SHO ARTHRS SRG RT8TR CUF RPR: CPT

## 2019-01-24 PROCEDURE — 81001 URINALYSIS AUTO W/SCOPE: CPT

## 2019-01-24 PROCEDURE — 82962 GLUCOSE BLOOD TEST: CPT

## 2019-01-24 RX ADMIN — FENTANYL CITRATE PRN MCG: 50 INJECTION INTRAMUSCULAR; INTRAVENOUS at 11:23

## 2019-01-24 RX ADMIN — FENTANYL CITRATE PRN MCG: 50 INJECTION INTRAMUSCULAR; INTRAVENOUS at 11:30

## 2019-01-24 NOTE — DISCHARGE SUMMARY
Discharge Summary (SDC)





- Discharge


Final Diagnosis: 





Right shoulder arthroscopic rotator cuff repair and distal clavicle excision


Date of Surgery: 01/24/19


Discharge Date: 01/24/19


Condition: Good


Treatment or Instructions: 


Patient is instructed to follow up in 10-14 days.


Patient instructed to remove dressing in 4 days then can shower and apply Band-

Aids as needed.


Patient to wear sling for comfort but okay to remove for shower and pendulum 

exercises.


Pendulum exercises are instructed to be done 3 times a day ideally with 

breakfast, lunch, dinners and showers.


Patient instructed to call if there is any signs of redness or drainage fevers 

or chills.


Prescriptions: 


Oxycodone HCl/Acetaminophen [Percocet 5-325 mg Tablet] 1 - 2 tab PO ASDIR PRN 

#30 tablet


 PRN Reason: 


Referrals: 


AMAYA ANGELES DO [Primary Care Provider] - 


Discharge Diet: As Tolerated


Respiratory Treatments at Home: Deep Breathing/Coughing


Discharge Activity: No Driving, No Lifting/Push/Pulling, Walk Frequently


Home Care Assistance: None Needed


Report the Following to Your Physician Immediately: Shortness of Breath, 

Vomiting, Increase in Pain, Fever over 101 Degrees, Unusual Bleeding, Drainage-

Yellow, Drainage-Gray, Drainage-Green, Drainage-Foul Smelling

## 2019-01-24 NOTE — OPERATIVE REPORT
Operative Report


DATE OF SURGERY: 01/24/19


PREOPERATIVE DIAGNOSIS: Right full-thickness rotator cuff tear and AC joint art

hropathy


POSTOPERATIVE DIAGNOSIS: Same


OPERATION: Right shoulder arthroscopic rotator cuff repair and distal clavicle 

excision


SURGEON: KAREEM BEAR


ANESTHESIA: GA


TISSUE REMOVED OR ALTERED: None


COMPLICATIONS: 





None


ESTIMATED BLOOD LOSS: Less than 20 mL


INTRAOPERATIVE FINDINGS: As above


PROCEDURE: 











IMPLANTS: Arthrex 5.5 bio composite corkscrews x2 and 4.75 bio composite swivel 

locks x2





DESCRIPTION OF PROCEDURE: Patient was brought to the operating room placed in 

supine position.  After successfully induced and intubated the patient patient 

was placed in the beachchair position the head and endotracheal tube was secured

appropriately.  The right shoulder was prepped and draped in a normal surgical 

fashion.  A timeout was done identifying the right shoulder as the correct site.

 After inflating the glenohumeral joint with sterile saline solution an 11 blade

was used to establish the posterior portal.  The arthroscope was introduced and 

return of fluid was seen showing that we successfully penetrated the 

glenohumeral joint.  With the use of spinal needle we're able to lesley the 

anterior portal and using an 11 blade able to establish anterior portal.  A 

cannula was introduced through the anterior portal.  At this point diagnostic 

scope was done.  As noted patient had not only a full-thickness large rotator 

cuff tear the patient also had a stump from a already torn long head of the 

biceps.  I proceeded to use a shaver to debride the remaining stump of the 

biceps and part of the greater tuberosity.  Noted the patient had no loose 

bodies.  Some fraying of the labrum so I did a minor debridement.  Articular 

cartilage was intact.


A lateral portal was established 11 blade.  4.0mm shaver was introduced and was 

used to prepare the tear and the frontal bone for preparation of anchor 

placement.  Once I was satisfied with the preparation I then redirected my scope

into the subacromial space.  A arthroscopic rotator cuff grasper to make sure I 

was able to pull the rotator cuff back onto the tuberosity.  The infraspinatus 

came back nicely but the supraspinatus would be a bit of a tight repair.


A percutaneous incision was then just adjacent to the acromion on the lateral 

aspect.  Through this percutaneous hole the awl was used to prepare the hole for

an anchor.  [place] was percutaneously sent flushed with the bone just adjacent 

to the articular margin.Same steps were taken for placement of our second anchor

more posteriorly.  Sutures were passed through the anterior portal for proper 

suture management.  With the use of the scorpion and I proceeded to pass the 

sutures through the rotator cuff tendon with proper suture management was able 

to pass the strands either through percutaneous hole or the anterior portal.  

Once I was satisfied with placement of all my sutures I then proceeded to do my 

arthroscopic knots.  At this point the strands were used to do our lateral row. 

Bicomposite show out was used and the lateral aspect of the humerus was then 

cleaned off with a shaver and electrocautery.  Once identified once I was 

replacement I proceeded to use my awl to do my hole.  This this point the 

sutures were adequately tensioned and subluxed for inserted and secured  

securing and increasing the footprint of the rotator cuff repair.  Remaining 

strands were cut with the arthroscopic cutter.  Then I turned my attention to 

use the anterior portal and use radiofrequency ablator to expose the AC joint.  

Once this was visualized I was able to use a 5.5mm barrel bur and do 

arthroscopic distal clavicle excision.  I resected just a little bit more of the

5.5 diameter of the bur to make sure I had enough resection.  I make sure it was

through and through superiorly anteriorly and posteriorly.  final pictures were 

taking showing my repair.  At this point fluid from the shoulder was removed 

camera and instruments were all removed.  I proceeded to close my portal sites 

with 3-0 nylon.  Xeroform 4 x 4 dressing followed by ABDs pads and Medipore tape

was applied.  Patient was placed in a sling and returned to supine position 

where he was successfully extubated and taken to PACU in stable condition.

## 2020-12-06 ENCOUNTER — HOSPITAL ENCOUNTER (EMERGENCY)
Dept: HOSPITAL 62 - ER | Age: 70
LOS: 1 days | Discharge: HOME | End: 2020-12-07
Payer: MEDICARE

## 2020-12-06 DIAGNOSIS — Z88.6: ICD-10-CM

## 2020-12-06 DIAGNOSIS — Z88.0: ICD-10-CM

## 2020-12-06 DIAGNOSIS — Z88.2: ICD-10-CM

## 2020-12-06 DIAGNOSIS — Z95.5: ICD-10-CM

## 2020-12-06 DIAGNOSIS — I25.10: ICD-10-CM

## 2020-12-06 DIAGNOSIS — Z88.1: ICD-10-CM

## 2020-12-06 DIAGNOSIS — R07.89: Primary | ICD-10-CM

## 2020-12-06 DIAGNOSIS — M54.9: ICD-10-CM

## 2020-12-06 DIAGNOSIS — Z87.892: ICD-10-CM

## 2020-12-06 DIAGNOSIS — I44.0: ICD-10-CM

## 2020-12-06 DIAGNOSIS — Z88.5: ICD-10-CM

## 2020-12-06 DIAGNOSIS — I10: ICD-10-CM

## 2020-12-06 LAB
ADD MANUAL DIFF: NO
ALBUMIN SERPL-MCNC: 4.3 G/DL (ref 3.5–5)
ALP SERPL-CCNC: 80 U/L (ref 38–126)
ANION GAP SERPL CALC-SCNC: 8 MMOL/L (ref 5–19)
AST SERPL-CCNC: 35 U/L (ref 14–36)
BASOPHILS # BLD AUTO: 0 10^3/UL (ref 0–0.2)
BASOPHILS NFR BLD AUTO: 0.4 % (ref 0–2)
BILIRUB DIRECT SERPL-MCNC: 0.2 MG/DL (ref 0–0.4)
BILIRUB SERPL-MCNC: 1.5 MG/DL (ref 0.2–1.3)
BUN SERPL-MCNC: 15 MG/DL (ref 7–20)
CALCIUM: 9.1 MG/DL (ref 8.4–10.2)
CHLORIDE SERPL-SCNC: 103 MMOL/L (ref 98–107)
CO2 SERPL-SCNC: 29 MMOL/L (ref 22–30)
EOSINOPHIL # BLD AUTO: 0.1 10^3/UL (ref 0–0.6)
EOSINOPHIL NFR BLD AUTO: 2.1 % (ref 0–6)
ERYTHROCYTE [DISTWIDTH] IN BLOOD BY AUTOMATED COUNT: 15.1 % (ref 11.5–14)
GLUCOSE SERPL-MCNC: 88 MG/DL (ref 75–110)
HCT VFR BLD CALC: 39.1 % (ref 36–47)
HGB BLD-MCNC: 13.6 G/DL (ref 12–15.5)
LYMPHOCYTES # BLD AUTO: 1.1 10^3/UL (ref 0.5–4.7)
LYMPHOCYTES NFR BLD AUTO: 20.3 % (ref 13–45)
MCH RBC QN AUTO: 33.5 PG (ref 27–33.4)
MCHC RBC AUTO-ENTMCNC: 34.7 G/DL (ref 32–36)
MCV RBC AUTO: 97 FL (ref 80–97)
MONOCYTES # BLD AUTO: 0.5 10^3/UL (ref 0.1–1.4)
MONOCYTES NFR BLD AUTO: 9.6 % (ref 3–13)
NEUTROPHILS # BLD AUTO: 3.8 10^3/UL (ref 1.7–8.2)
NEUTS SEG NFR BLD AUTO: 67.6 % (ref 42–78)
PLATELET # BLD: 162 10^3/UL (ref 150–450)
POTASSIUM SERPL-SCNC: 4.2 MMOL/L (ref 3.6–5)
PROT SERPL-MCNC: 6.6 G/DL (ref 6.3–8.2)
RBC # BLD AUTO: 4.05 10^6/UL (ref 3.72–5.28)
TOTAL CELLS COUNTED % (AUTO): 100 %
WBC # BLD AUTO: 5.6 10^3/UL (ref 4–10.5)

## 2020-12-06 PROCEDURE — 99285 EMERGENCY DEPT VISIT HI MDM: CPT

## 2020-12-06 PROCEDURE — 36415 COLL VENOUS BLD VENIPUNCTURE: CPT

## 2020-12-06 PROCEDURE — 96374 THER/PROPH/DIAG INJ IV PUSH: CPT

## 2020-12-06 PROCEDURE — 93005 ELECTROCARDIOGRAM TRACING: CPT

## 2020-12-06 PROCEDURE — 84484 ASSAY OF TROPONIN QUANT: CPT

## 2020-12-06 PROCEDURE — 71275 CT ANGIOGRAPHY CHEST: CPT

## 2020-12-06 PROCEDURE — 93010 ELECTROCARDIOGRAM REPORT: CPT

## 2020-12-06 PROCEDURE — 85025 COMPLETE CBC W/AUTO DIFF WBC: CPT

## 2020-12-06 PROCEDURE — 71045 X-RAY EXAM CHEST 1 VIEW: CPT

## 2020-12-06 PROCEDURE — 80053 COMPREHEN METABOLIC PANEL: CPT

## 2020-12-06 NOTE — ER DOCUMENT REPORT
ED General





- General


Chief Complaint: Chest Pain > 30


Stated Complaint: CHEST PAIN


Time Seen by Provider: 20 22:59


Primary Care Provider: 


AMAYA ANGELES DO [Primary Care Provider] - Follow up as needed


Mode of Arrival: Ambulatory


Information source: Patient


Notes: 





70-year-old woman presents to the emergency department with a history of chest 

pain.  She states that she was running over and stood up right and had a sudden 

sharp pain that radiated into her right chest and into her right back.  This 

episode of pain occurred on 2020.  That time she has had 

continued pain in her right chest and upper back area.  She took 2 nitroglycerin

tablets with no relief.  Today the pain is also in her left upper back region 

and because of her history of coronary disease she decided to come to the 

emergency department for evaluation and treatment.  She admits that she began a 

new exercise program 3 days ago and part of the challenge she had to do push-

ups.  She states that she was able to do all of the push-ups without stopping 

and did not have pain during the 3 days after the exercise. She denies shortness

of breath, palpitations or dizziness.  She does complain of pain with deep 

breath and movement.


TRAVEL OUTSIDE OF THE [place] IN LAST 30 DAYS: No





- Related Data


Allergies/Adverse Reactions: 


                                        





cefuroxime axetil [From Ceftin] Allergy (Verified 19 09:36)


   Anaphylaxis


codeine [Codeine] Allergy (Verified 19 09:36)


   Tachycardia


hydrocodone [Hydrocodone] Allergy (Verified 19 09:36)


   Tachycardia


Penicillins Allergy (Verified 19 09:36)


   Anaphylaxis


prochlorperazine edisylate [From Compazine] Allergy (Verified 19 09:36)


   Jaundice


prochlorperazine maleate [From Compazine] Allergy (Verified 19 09:36)


   Jaundice


Sulfa (Sulfonamide Antibiotics) Allergy (Verified 19 09:36)


   rash











Past Medical History





- Social History


Smoking Status: Never Smoker


Family History: Reviewed & Not Pertinent





- Past Medical History


Cardiac Medical History: Reports: Hx Coronary Artery Disease - CABG x4, Hx 

Hypercholesterolemia, Hx Hypertension - MEDICATED


   Denies: Hx Heart Attack


Pulmonary Medical History: 


   Denies: Hx Asthma, Hx Bronchitis, Hx COPD, Hx Pneumonia


Neurological Medical History: Reports: Hx Migraine.  Denies: Hx Cerebrovascular 

Accident, Hx Seizures


Endocrine Medical History: Reports: Hx Hypothyroidism


Renal/ Medical History: Denies: Hx Peritoneal Dialysis


GI Medical History: Denies: Hx Hepatitis, Hx Hiatal Hernia, Hx Ulcer


Musculoskeletal Medical History: Reports Hx Arthritis - LULU. HANDS/KNEES/FEET


Psychiatric Medical History: Reports: Hx Anxiety


Infectious Medical History: Denies: Hx Hepatitis


Past Surgical History: Reports: Hx Appendectomy, Hx Cardiac Surgery - Bypass, Hx

Cholecystectomy, Hx Orthopedic Surgery - right knee surgery, Hx Tonsillectomy.  

Denies: Hx Hysterectomy, Hx Mastectomy, Hx Open Heart Surgery, Hx Pacemaker





- Immunizations


Immunizations up to date: Yes


Hx Diphtheria, Pertussis, Tetanus Vaccination: Yes


Hx Pneumococcal Vaccination: 13





Review of Systems





- Review of Systems


Notes: 





Constitutional: Negative for fever.


HENT: Negative for sore throat.


Eyes: Negative for visual changes.


Cardiovascular: + See HPI


Respiratory: Negative for shortness of breath.


Gastrointestinal: Negative for abdominal pain, vomiting or diarrhea.


Genitourinary: Negative for dysuria.


Musculoskeletal: + See HPI


Skin: Negative for rash.


Neurological: Negative for headaches, weakness or numbness.





10 point ROS negative except as marked above and in HPI.





Physical Exam





- Vital signs


Vitals: 


                                        











Temp Pulse Resp BP Pulse Ox


 


 98.3 F   63   18   171/78 H  96 


 


 20 20:30  20 20:30  20 20:30  20 20:30  20 20:30














- Notes


Notes: 





PHYSICAL EXAMINATION:


 


Physical Exam:


General: Well-nourished well-developed-year-old woman in moderate acute distress


HEENT: NC/AT, pupils equal round and reactive to light, MM moist,nares clear, 

oropharynx clear, airway patent


Neck: supple, no adenopathy, no masses.  Good range of motion


Lungs: clear, no wheezing, no rales no rhonchi


Chest: + Tenderness in the upper trapezius muscles bilaterally and in the right 

anterior chest pectoralis muscle.


CVS: Regular rate and rhythm no murmur gallop or rub


Abdomen: Soft, active, nontender, no masses, no hepatosplenomegaly


Ext:   No edema, clubbing or cyanosis.


Neuro: Alert and responsive, moving all 4 extremities on command, cranial nerves

intact, no focal findings


Skin: Intact no open lesions, no rash


PSYCH: Normal mood, normal affect.


 








Course





- Re-evaluation


Re-evalutation: 





20 02:10


I discussed the findings of the EKG, lab tests and the CTA with the patient 

explained to her that her chest pain and back pain are not related to an acute 

coronary event or pulmonary embolus.  Is likely musculoskeletal.  Patient has 

800 mg Motrin at home and a muscle relaxant that she had been using in the past.

 I have asked her to decrease her workout and exclude push-ups until her 

symptoms have resolved completely.  Patient acknowledges understanding this plan

and is in agreement.





- Vital Signs


Vital signs: 


                                        











Temp Pulse Resp BP Pulse Ox


 


 98.3 F   63   18   157/87 H  97 


 


 20 20:30  20 20:30  20 00:20  20 00:01  20 00:20














- Laboratory


Result Diagrams: 


                                 20 21:21





                                 20 21:21


Laboratory results interpreted by me: 


                                        











  20





  21:21 21:21


 


MCH  33.5 H 


 


RDW  15.1 H 


 


Total Bilirubin   1.5 H











20 23:13


I have reviewed laboratory data and used this information for the treatment 

decisions regarding the patient.





- Diagnostic Test


Radiology reviewed: Image reviewed, Reports reviewed


Radiology results interpreted by me: 





20 23:13





                                        





Chest X-Ray  20 20:49


IMPRESSION:


 


No acute finding.


 


 


copyright 2011 [employer]- All Rights Reserved


 











20 02:10


CTA chest no pulmonary embolus no infiltrative process.





- EKG Interpretation by Me


Rate: Normal - EKG interpreted by Dr. Fernandez: Normal sinus rhythm, rate 57, 1st

degree AV block, [place] interval 220 ms QT interval 452 ms, normal axis, Q-wave lead

II, no acute ST or T wave abnormalities, no ischemic findings,





Discharge





- Discharge


Clinical Impression: 


 Non-cardiac chest pain, Chest wall pain





Condition: Good


Disposition: HOME, SELF-CARE


Instructions:  Chest Wall Pain ([facility])


Additional Instructions: 


You were seen in the emergency department tonight with episodic chest pain which

was not related to a acute heart related problem or blood clot to the lung.  It 

is likely that your pain is musculoskeletal and related to your new workout 

regime.  Continue the medications that you were prescribed previously ibuprofen 

and muscle relaxant you may use a cold compress on the area of pain.  Stretching

exercises prior to workout may reduce the amount of soft tissue injury.





Please follow-up with your primary care doctor if needed








HOME CARE INSTRUCTIONS & INFORMATION:  Thank you for choosing us for your 

medical needs. We hope you're satisfied with the care you received.  After you 

leave, you must properly care for your problem and, at the same time, observe 

its progress.  Any condition can change.  Some illnesses can change rapidly over

hours or days.  If your condition worsens, return to the Emergency Department or

see your physician promptly.





ABOUT YOUR X-RAYS AND EKG'S:   If you had an EKG or X-rays taken, they have been

read by the Emergency Physician. The X-rays and EKG's will also be read by a 

Radiologist or Cardiologist within 24 hours.  If discrepancies are noted, you 

will be notified by telephone.  Please be certain the ED has a correct telephone

number & address where you can be reached.  Also, realize that some fractures or

abnormalities do not show up on initial X-rays.  If your symptoms continue, see 

your physician.





ABOUT YOUR LABORATORY TEST:   If you had laboratory tests, the results have been

reviewed by the Emergency Physician.  Some test results (for example cultures) 

may not be available for several days.  You will be contacted if any test result

shows you need additional treatment.  Please be certain the ED has a correct 

telephone number and address where you can be reached.





ABOUT YOUR MEDICATIONS:  You will receive instructions on how to take your 

medicine on the prescription label you receive.  Additional information may be 

provided by the Pharmacy.  If you have questions afterwards, call the ED for 

clarification or further instructions.  Some prescribed medications may cause 

drowsiness.  Do not perform tasks such as driving a car or operating machinery 

without consulting your Pharmacist.  If you feel you need a refill of pain 

medication, your condition will need re-evaluation.  Please do not call for a 

refill of any medication.





ABOUT YOUR SIGNATURE:   Signature of this document acknowledges to followin. Understanding that you received emergency treatment and that you may be 

released before al medical problems are known or treated. Please be certain   

the ED has a correct phone number & address where you can be reached.


   2. Acknowledgement that you will arrange for follow-up care as recommended.


   3. Authorization for the Emergency Physician to provide information to your 

follow-up Physician in order to maximize your care.





AT ANY TIME, IF YOUR SYMPTOMS CHANGE SIGNIFICANTLY OR WORSEN OR YOU DEVELOP NEW 

SYMPTOMS, RETURN TO THE EMERGENCY DEPARTMENT IMMEDIATELY FOR RE-EVALUATION.





OUR GOAL IS TO PROVIDE EXCELLENT MEDICAL CARE!





WE HOPE THAT WE HAVE MET YOUR EXPECTATIONS DURING YOUR EMERGENCY DEPARTMENT 

VISIT AND THAT YOU FEEL YOU HAVE RECEIVED EXCELLENT CARE!











Referrals: 


AMAYA ANGELES,  [Primary Care Provider] - Follow up as needed

## 2020-12-06 NOTE — RADIOLOGY REPORT (SQ)
EXAM DESCRIPTION: 



XR CHEST 1 VIEW



COMPLETED DATE/TME:  12/06/2020 21:27



CLINICAL HISTORY: 



70 years, Female, chest pain



COMPARISON:

None.



NUMBER OF VIEWS:





TECHNIQUE:





LIMITATIONS:

None.



FINDINGS:



No evidence of pulmonary infiltrate or pleural effusion.



The heart is normal in size.



There is evidence of prior coronary artery bypass surgery.



Pulmonary vascularity appears normal.



There are atherosclerotic changes and tortuosity of the thoracic

aorta.



IMPRESSION:



No acute finding.

 



copyright 2011 [employer]- All Rights Reserved

## 2020-12-06 NOTE — ER DOCUMENT REPORT
ED Medical Screen (RME)





- General


Stated Complaint: CHEST PAIN


Primary Care Provider: 


AMAYA ANGELES DO [Primary Care Provider] - Follow up as needed


TRAVEL OUTSIDE OF THE [place] IN LAST 30 DAYS: No





- HPI


Notes: 





12/06/20 20:48


Rapid Medical Exam                                





HPI: Pt is a 69yo female c/o chest pain since yesterday afternoon. Says she was 

walking around the house when it began.  pain is right sided and radiates to her

right shoulder, and left upper back.  pain is pleuritic and leaning over also 

worsens pain. hx of quadruple bypass in 2012. Denies diaphoresis or nausea.  

mild sob. no hx of pe/dvt. last stress test was september 2020.   denies abdom 

pain, dysuria, or fever/chills.   recently started nitrostat ,  took 2 pills 

yesterday and it did not help her symptoms. 











Physical Exam:





GENERAL: Well-appearing, well-nourished and in no acute distress.


HEAD: Atraumatic, normocephalic.


ENT: Moist mucous membranes.


RESP: Respirations even and unlabored


CV- Regular rate. 


NEURO: No focal neurological deficits. Moves all extremities spontaneously and 

on command.








My involvement in this patients care was limited to a rapid initial assessment. 

A comprehensive ED assessment and evaluation of the patient, analysis of test 

results, treatment, and completion of the medical decision making process will 

be performed by other ER providers.


12/06/20 20:52








- Related Data


Allergies/Adverse Reactions: 


                                        





cefuroxime axetil [From Ceftin] Allergy (Verified 01/17/19 09:36)


   Anaphylaxis


codeine [Codeine] Allergy (Verified 01/17/19 09:36)


   Tachycardia


hydrocodone [Hydrocodone] Allergy (Verified 01/17/19 09:36)


   Tachycardia


Penicillins Allergy (Verified 01/17/19 09:36)


   Anaphylaxis


prochlorperazine edisylate [From Compazine] Allergy (Verified 01/17/19 09:36)


   Jaundice


prochlorperazine maleate [From Compazine] Allergy (Verified 01/17/19 09:36)


   Jaundice


Sulfa (Sulfonamide Antibiotics) Allergy (Verified 01/17/19 09:36)


   rash











Past Medical History





- Past Medical History


Cardiac Medical History: Reports: Hx Coronary Artery Disease - CABG x4, Hx 

Hypercholesterolemia, Hx Hypertension - MEDICATED


   Denies: Hx Heart Attack


Pulmonary Medical History: 


   Denies: Hx Asthma, Hx Bronchitis, Hx COPD, Hx Pneumonia


Neurological Medical History: Reports: Hx Migraine.  Denies: Hx Cerebrovascular 

Accident, Hx Seizures


Endocrine Medical History: Reports: Hx Hypothyroidism


Renal/ Medical History: Denies: Hx Peritoneal Dialysis


GI Medical History: Denies: Hx Hepatitis, Hx Hiatal Hernia, Hx Ulcer


Musculoskeltal Medical History: Reports Hx Arthritis - LULU. HANDS/KNEES/FEET


Psychiatric Medical History: Reports: Hx Anxiety


Infectious Medical History: Denies: Hx Hepatitis


Past Surgical History: Reports: Hx Appendectomy, Hx Cardiac Surgery - Bypass, Hx

Cholecystectomy, Hx Orthopedic Surgery - right knee surgery, Hx Tonsillectomy.  

Denies: Hx Hysterectomy, Hx Mastectomy, Hx Open Heart Surgery, Hx Pacemaker





- Immunizations


Immunizations up to date: Yes


Hx Diphtheria, Pertussis, Tetanus Vaccination: Yes





Physical Exam





- Vital signs


Vitals: 


                                        











Temp Pulse Resp BP Pulse Ox


 


 98.3 F   63   18   171/78 H  96 


 


 12/06/20 20:30  12/06/20 20:30  12/06/20 20:30  12/06/20 20:30  12/06/20 20:30














Course





- Vital Signs


Vital signs: 


                                        











Temp Pulse Resp BP Pulse Ox


 


 98.3 F   63   18   171/78 H  96 


 


 12/06/20 20:30  12/06/20 20:30  12/06/20 20:30  12/06/20 20:30  12/06/20 20:30














Doctor's Discharge





- Discharge


Referrals: 


AMAYA ANGELES DO [Primary Care Provider] - Follow up as needed

## 2020-12-07 VITALS — DIASTOLIC BLOOD PRESSURE: 90 MMHG | SYSTOLIC BLOOD PRESSURE: 174 MMHG

## 2020-12-07 NOTE — RADIOLOGY REPORT (SQ)
EXAM DESCRIPTION:

CT CHEST ANGIOGRAPHY WITHOUT THEN WITH IV CONTRAST



COMPLETED DATE/TME:  12/07/2020 01:11



CLINICAL HISTORY:

70 years Female, Chest pain/pain with breath



Comparison: CR, same day.



Technique:  IV contrast.  Coronal and sagittal reformat.  3d

reconstruction.  This exam was performed according to our

departmental dose-optimization program, which includes automated

exposure control, adjustment of the mA and/or kV according to

patient size and/or use of iterative reconstruction

technique.[facility]: Dose Right [facility]: CareDose   [facility]: Dose Right   

[facility]: Teradose 4D    [facility]: Smart Melon Power



LIMITATIONS:

Quality of pulmonary arteriogram: Suboptimal.



Findings: 



No pulmonary embolus. No right ventricular strain.

Atelectasis/scar.  Sternotomy.

Coronary arterial calcification.  Atherosclerotic vascular

disease.  

Cardiac/mediastinal hardware/clips.  

Cholecystectomy.  

Inferior neck, axillae, mediastinum, airway, lymphatics, heart,

vasculature, upper abdomen, and musculoskeleton appear otherwise

unremarkable.



Impression:  No pulmonary embolus.  No acute cardiopulmonary

findings.

## 2020-12-07 NOTE — EKG REPORT
SEVERITY:- ABNORMAL ECG -

SINUS RHYTHM

FIRST DEGREE AV BLOCK

INCOMPLETE RIGHT BUNDLE BRANCH BLOCK

BORDERLINE INFERIOR Q WAVES

:

Confirmed by: Pino Nicole 07-Dec-2020 08:15:21

## 2021-01-04 LAB
ANION GAP SERPL CALC-SCNC: 8 MMOL/L (ref 5–19)
APPEARANCE UR: (no result)
APTT BLD: 33.1 SEC (ref 23.5–35.8)
APTT PPP: YELLOW S
BILIRUB UR QL STRIP: (no result)
BUN SERPL-MCNC: 19 MG/DL (ref 7–20)
CALCIUM: 9.2 MG/DL (ref 8.4–10.2)
CHLORIDE SERPL-SCNC: 105 MMOL/L (ref 98–107)
CO2 SERPL-SCNC: 29 MMOL/L (ref 22–30)
CRP SERPL-MCNC: < 5 MG/L (ref ?–10)
ERYTHROCYTE [DISTWIDTH] IN BLOOD BY AUTOMATED COUNT: 14 % (ref 11.5–14)
ERYTHROCYTE [SEDIMENTATION RATE] IN BLOOD: 16 MM/HR (ref 0–30)
GLUCOSE SERPL-MCNC: 107 MG/DL (ref 75–110)
GLUCOSE UR STRIP-MCNC: NEGATIVE MG/DL
HCT VFR BLD CALC: 40.7 % (ref 36–47)
HGB BLD-MCNC: 14.2 G/DL (ref 12–15.5)
INR PPP: 0.97
KETONES UR STRIP-MCNC: NEGATIVE MG/DL
MCH RBC QN AUTO: 33.2 PG (ref 27–33.4)
MCHC RBC AUTO-ENTMCNC: 34.8 G/DL (ref 32–36)
MCV RBC AUTO: 96 FL (ref 80–97)
NITRITE UR QL STRIP: NEGATIVE
PH UR STRIP: 5 [PH] (ref 5–9)
PLATELET # BLD: 156 10^3/UL (ref 150–450)
POTASSIUM SERPL-SCNC: 4.1 MMOL/L (ref 3.6–5)
PROT UR STRIP-MCNC: 30 MG/DL
PROTHROMBIN TIME: 13.1 SEC (ref 11.4–15.4)
RBC # BLD AUTO: 4.26 10^6/UL (ref 3.72–5.28)
SP GR UR STRIP: 1.03
UROBILINOGEN UR-MCNC: NEGATIVE MG/DL (ref ?–2)
WBC # BLD AUTO: 4.8 10^3/UL (ref 4–10.5)

## 2021-01-07 ENCOUNTER — HOSPITAL ENCOUNTER (OUTPATIENT)
Dept: HOSPITAL 62 - OROUT | Age: 71
Discharge: HOME | End: 2021-01-07
Payer: MEDICARE

## 2021-01-07 VITALS — DIASTOLIC BLOOD PRESSURE: 89 MMHG | SYSTOLIC BLOOD PRESSURE: 160 MMHG

## 2021-01-07 DIAGNOSIS — M19.90: ICD-10-CM

## 2021-01-07 DIAGNOSIS — Z95.1: ICD-10-CM

## 2021-01-07 DIAGNOSIS — Z98.890: ICD-10-CM

## 2021-01-07 DIAGNOSIS — Z79.891: ICD-10-CM

## 2021-01-07 DIAGNOSIS — Z79.84: ICD-10-CM

## 2021-01-07 DIAGNOSIS — G47.33: ICD-10-CM

## 2021-01-07 DIAGNOSIS — Z79.890: ICD-10-CM

## 2021-01-07 DIAGNOSIS — E66.9: ICD-10-CM

## 2021-01-07 DIAGNOSIS — I10: ICD-10-CM

## 2021-01-07 DIAGNOSIS — M17.31: Primary | ICD-10-CM

## 2021-01-07 DIAGNOSIS — Z79.899: ICD-10-CM

## 2021-01-07 DIAGNOSIS — F41.9: ICD-10-CM

## 2021-01-07 DIAGNOSIS — E03.9: ICD-10-CM

## 2021-01-07 DIAGNOSIS — Z01.812: ICD-10-CM

## 2021-01-07 DIAGNOSIS — Z87.891: ICD-10-CM

## 2021-01-07 DIAGNOSIS — E11.9: ICD-10-CM
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
## 2021-01-07 DIAGNOSIS — U07.1: ICD-10-CM

## 2021-01-07 DIAGNOSIS — Z79.01: ICD-10-CM

## 2021-01-07 DIAGNOSIS — S82.141S: ICD-10-CM

## 2021-01-07 LAB
APTT BLD: 32.8 SEC (ref 23.5–35.8)
GLUCOSE SERPL-MCNC: 95 MG/DL (ref 75–110)
INR PPP: 0.96
POTASSIUM SERPL-SCNC: 4 MMOL/L (ref 3.6–5)
PROTHROMBIN TIME: 13 SEC (ref 11.4–15.4)

## 2021-01-07 PROCEDURE — 81001 URINALYSIS AUTO W/SCOPE: CPT

## 2021-01-07 PROCEDURE — 84132 ASSAY OF SERUM POTASSIUM: CPT

## 2021-01-07 PROCEDURE — 85730 THROMBOPLASTIN TIME PARTIAL: CPT

## 2021-01-07 PROCEDURE — C9803 HOPD COVID-19 SPEC COLLECT: HCPCS

## 2021-01-07 PROCEDURE — 85652 RBC SED RATE AUTOMATED: CPT

## 2021-01-07 PROCEDURE — 73560 X-RAY EXAM OF KNEE 1 OR 2: CPT

## 2021-01-07 PROCEDURE — 82947 ASSAY GLUCOSE BLOOD QUANT: CPT

## 2021-01-07 PROCEDURE — 80048 BASIC METABOLIC PNL TOTAL CA: CPT

## 2021-01-07 PROCEDURE — 86140 C-REACTIVE PROTEIN: CPT

## 2021-01-07 PROCEDURE — 85610 PROTHROMBIN TIME: CPT

## 2021-01-07 PROCEDURE — 36415 COLL VENOUS BLD VENIPUNCTURE: CPT

## 2021-01-07 PROCEDURE — 85027 COMPLETE CBC AUTOMATED: CPT

## 2021-01-07 PROCEDURE — 87635 SARS-COV-2 COVID-19 AMP PRB: CPT

## 2021-01-07 PROCEDURE — 20680 REMOVAL OF IMPLANT DEEP: CPT

## 2021-01-07 PROCEDURE — 01392 ANES OPN PX UPR TIB FIB&/PAT: CPT

## 2021-01-07 RX ADMIN — BUPIVACAINE HYDROCHLORIDE AND EPINEPHRINE BITARTRATE ONE ML: 5; .005 INJECTION, SOLUTION EPIDURAL; INTRACAUDAL; PERINEURAL at 08:59

## 2021-01-07 RX ADMIN — BUPIVACAINE HYDROCHLORIDE AND EPINEPHRINE BITARTRATE ONE ML: 5; .005 INJECTION, SOLUTION EPIDURAL; INTRACAUDAL; PERINEURAL at 08:55

## 2021-01-07 NOTE — RADIOLOGY REPORT (SQ)
EXAM DESCRIPTION:  NO CHG FLUORO; KNEE RIGHT 2 VIEWS



IMAGES COMPLETED DATE/TIME:  1/7/2021 9:23 am



REASON FOR STUDY:  HARDWARE REMOVAL RIGHT KNEE ASSISTED WITH FLUORO IN OR



COMPARISON:  None.



FLUOROSCOPY TIME:  No recordable fluoro time

1 Images saved to PACS



LIMITATIONS:  None.



PROCEDURE:  Hardware removal



FINDINGS:  An image from fluoro shows no hardware.



IMPRESSION:  Hardware removal.  Refer to operative note for further information.



COMMENT:  PQRS 6045F:  Fluoroscopy time of the procedure is documented in the report.



TECHNICAL DOCUMENTATION:  JOB ID:  [id number]

 2011 [employer]- All Rights Reserved



Reading location - IP/workstation name: [email or web address]

## 2021-01-07 NOTE — OPERATIVE REPORT
Operative Report


DATE OF SURGERY: 01/07/21


PREOPERATIVE DIAGNOSIS: Right knee severe posttraumatic osteoarthritis, sequela 

of lateral tibial plateau fracture with retained hardware.


POSTOPERATIVE DIAGNOSIS: Right knee severe posttraumatic osteoarthritis, sequela

of lateral tibial plateau fracture with retained hardware.


OPERATION: Removal of hardware right knee


SURGEON: WILLIAM CLARKE JR


ANESTHESIA: Spinal


COMPLICATIONS: 





None


ESTIMATED BLOOD LOSS: 10 cc


PROCEDURE: 





The patient was brought in the operating and provided with spinal anesthesia 

preoperatively.  They are provided with 900 mg of clindamycin preoperatively.  

The right lower extremity was then prepped and draped in sterile sterile 

fashion.  An appropriate timeout was performed.  An Esmarch was used to 

exsanguinate the right lower extremity and the tourniquet was inflated to 300 

mmHg





The prior right knee incision was marked out which was a hockey-stick style 

incision that was direct anterior inferiorly and then progressed 90 degrees 

laterally at the joint line.  I proceeded to make a full length direct anterior 

incision utilizing her prior scar inferiorly.  Skin flaps were carefully 

developed laterally.  I was able to identify the plate underneath the fascia.  

Proceeded to reflect the anterior lateral fascia off of the plate and expose the

screw heads.  With a 3 5 screwdriver I was able to get good interference in the 

screw head and relatively easily remove all 4 screws from the plate.  The most 

inferior screw did have some overlying bone that needed to be carefully removed 

with an osteotome.  After removal of all screws, I used a osteotome to elevate 

the plate from the bone.  A plate was then removed.  Fluoroscopy was then used 

to demonstrate no retention of hardware.  At no point did we need to violate the

joint capsule.  The tourniquet was then deflated no overt bleeding was 

encountered.





The wound was then copiously irrigated with dilute Betadine solution.  I used an

0 PDS in a running locking fashion to approximate the anterior lateral fascia 

back to its origin.  After this a 2-0 Monocryl was used in interrupted fashion 

to close the dermal layer followed by a running 2 oh Quill in the epidermal 

layer.  20 cc of local anesthetic was provided.  The wound was then dressed with

sterile Xeroform 4 x 4's web roll and finally an Ace wrap.  Patient was then 

awakened from anesthesia and transferred to PACU in stable condition.

## 2021-01-07 NOTE — DISCHARGE SUMMARY
Discharge Summary (SDC)





- Discharge


Final Diagnosis: 





Right knee severe posttraumatic osteoarthritis, sequela of lateral tibial 

plateau fracture with retained hardware.


Date of Surgery: 01/07/21


Discharge Date: 01/07/21


Condition: Stable


Treatment or Instructions: 


Patient is weightbearing as tolerated





Follow-up with Dr. Pino Irene, orthopedic surgeon at [facility] for 

surgery, in 7-10 days.  Call for an appointment.  Telephone #[phone]. [phone], [place] [address]





She is to leave her dressing in place for 2 to 3 days.  After which she may 

change as needed with sterile dry dressings.


She has been provided with postoperative pain medication from the office, 

follow the prescription instructions


No operating motor vehicles while taking narcotic prescriptions


Take a baby aspirin daily for DVT prophylaxis


Keep the lower extremity elevated, encourage continued full range of motion and

gradual return to full activity.


Referrals: 


BYRON CASANOVA PA-C [Primary Care Provider] - 


Respiratory Treatments at Home: Deep Breathing/Coughing


Discharge Activity: Activity As Tolerated, No Driving, Keep Legs Elevated, No 

Lifting/Push/Pulling, Slowly Increase Activity, No tub bath, Walk Frequently


Report the Following to Your Physician Immediately: Shortness of Breath, Fever 

over 101 Degrees, Unusual Bleeding

## 2021-01-07 NOTE — RADIOLOGY REPORT (SQ)
EXAM DESCRIPTION:  NO CHG FLUORO; KNEE RIGHT 2 VIEWS



IMAGES COMPLETED DATE/TIME:  1/7/2021 9:23 am



REASON FOR STUDY:  HARDWARE REMOVAL RIGHT KNEE ASSISTED WITH FLUORO IN OR



COMPARISON:  None.



FLUOROSCOPY TIME:  No recordable fluoro time

1 Images saved to PACS



LIMITATIONS:  None.



PROCEDURE:  Hardware removal



FINDINGS:  An image from fluoro shows no hardware.



IMPRESSION:  Hardware removal.  Refer to operative note for further information.



COMMENT:  PQRS 6045F:  Fluoroscopy time of the procedure is documented in the report.



TECHNICAL DOCUMENTATION:  JOB ID:  [id number]

 2011 [email or web address]- All Rights Reserved



Reading location - IP/workstation name: [email or web address]

## 2024-05-14 NOTE — ER DOCUMENT REPORT
Detail Level: Detailed HPI





- HPI


Patient complains to provider of: Right wrist pain


Time Seen by Provider: 12/27/18 17:14


Onset: This afternoon


Onset/Duration: Sudden


Quality of pain: Achy


Severity: Severe


Pain Level: 5


Context: 





She presents emergency department with complaints of right wrist hand pain.  

Patient reports she was helping a friend move when she tripped over a bag she 

was dragging.  She reports she was not dizzy she just tripped.  Complains of 

pain with movement and also reports constant pain


Associated Symptoms: None


Exacerbated by: Movement


Relieved by: Denies


Similar symptoms previously: No


Recently seen / treated by doctor: No





- REPRODUCTIVE


Reproductive: DENIES: Pregnant:





Past Medical History





- General


Information source: Patient





- Social History


Smoking Status: Unknown if Ever Smoked


Cigarette use (# per day): No


Frequency of alcohol use: None


Drug Abuse: None


Family History: Reviewed & Not Pertinent


Patient has suicidal ideation: No


Patient has homicidal ideation: No





- Past Medical History


Cardiac Medical History: Reports: Hx Coronary Artery Disease - CABG x4, Hx 

Hypercholesterolemia, Hx Hypertension - MEDICATED


   Denies: Hx Heart Attack


Pulmonary Medical History: 


   Denies: Hx Asthma, Hx Bronchitis, Hx COPD, Hx Pneumonia


Neurological Medical History: Reports: Hx Migraine.  Denies: Hx Cerebrovascular 

Accident, Hx Seizures


Endocrine Medical History: Reports: Hx Hypothyroidism


Renal/ Medical History: Denies: Hx Peritoneal Dialysis


GI Medical History: Denies: Hx Hepatitis, Hx Hiatal Hernia, Hx Ulcer


Musculoskeletal Medical History: Reports Hx Arthritis - LULU. HANDS/KNEES/FEET


Psychiatric Medical History: Reports: Hx Anxiety


Infectious Medical History: Denies: Hx Hepatitis


Past Surgical History: Reports: Hx Appendectomy, Hx Cardiac Surgery - Bypass, Hx

Cholecystectomy, Hx Orthopedic Surgery - right knee surgery, Hx Tonsillectomy.  

Denies: Hx Hysterectomy, Hx Mastectomy, Hx Open Heart Surgery, Hx Pacemaker





- Immunizations


Immunizations up to date: Yes


Hx Diphtheria, Pertussis, Tetanus Vaccination: Yes


Hx Pneumococcal Vaccination: 11/01/13





Vertical Provider Document





- CONSTITUTIONAL


Agree With Documented VS: Yes


Exam Limitations: No Limitations


General Appearance: WD/WN, Mild Distress - guarding hand





- INFECTION CONTROL


TRAVEL OUTSIDE OF THE [place] IN LAST 30 DAYS: No





- HEENT


HEENT: Atraumatic





- NECK


Neck: Supple





- RESPIRATORY


Respiratory: No Respiratory Distress





- CARDIOVASCULAR


Cardiovascular: Regular Rate





- MUSCULOSKELETAL/EXTREMETIES


Musculoskeletal/Extremeties: Tender - right dorsal wrist and hand pain, ganglion

cyst noted, good cap refill, + radial pulse





- NEURO


Level of Consciousness: Awake, Alert, Appropriate


Motor/Sensory: No Motor Deficit





- DERM


Integumentary: Warm, Dry


Adult Front & Back Diagram: 


                            __________________________














                            __________________________





 1 - c/o pain, slightly swollen dorsally, good radial pulse, good cap refill, 

denies snuff box pain








Course





- Re-evaluation


Re-evalutation: 





12/27/18 18:14


Patient instructed on negative x-rays.  Area is very tender slightly swollen.  

Patient was instructed on the importance of follow-up with Dr. sadler tomorrow 

for recheck.  Reports patient instructed we will protect the area with Ace wrap 

and sling.  Patient was offered stronger pain medication but declined.





- Vital Signs


Vital signs: 


                                        











Temp Pulse Resp BP Pulse Ox


 


 98.4 F   66   18   193/87 H  95 


 


 12/27/18 16:32  12/27/18 16:32  12/27/18 16:32  12/27/18 16:32  12/27/18 16:32














- Diagnostic Test


Radiology reviewed: Image reviewed, Reports reviewed - EXAM DESCRIPTION: WRIST 

RIGHT 3 VIEWS   COMPLETED DATE/TIME: 12/27/2018 5:19 pm   REASON FOR STUDY: fall

hurts   COMPARISON: None.   NUMBER OF VIEWS: Three views.   TECHNIQUE: AP, 

lateral, and oblique radiographic images acquired of the right wrist.   

LIMITATIONS: None.   FINDINGS: MINERALIZATION: Normal.  BONES: No acute fracture

or dislocation. No worrisome bone lesions. Normal alignment.  SOFT TISSUES: No 

soft tissue swelling. No foreign body.  OTHER: No other significant finding.   

IMPRESSION: NEGATIVE STUDY OF THE RIGHT WRIST. NO RADIOGRAPHIC EVIDENCE OF ACUTE

INJURY.





Procedures





- Immobilization


  ** Right Wrist


Pre-Proc Neuro Vasc Exam: Normal


Immobilizer type: Ace wrap, Sling


Performed by: PCT


Post-Proc Neuro Vasc Exam: Unchanged from pre-exam


Alignment checked and good: Yes





Discharge





- Discharge


Clinical Impression: 


 right wrist and hand pain





Condition: Stable


Disposition: HOME, SELF-CARE


Instructions:  Ace Wrap ([facility]), Use of Over-The-Counter Ibuprofen ([facility]), Sling to

be Used ([facility])


Additional Instructions: 


*You have been evaluated for right wrist/hand pain


*Rest/Ice/Elevate your wrist/hand


*Maintain the ace wrap and use the sling during the day


*Follow up with Dr Sadler tomorrow for recheck


*Take ibuprofen as indicated for pain


*Return to ED for worsening condition, changes, needs





Forms:  Elevated Blood Pressure


Referrals: 


KAREEM BADILLO MD [ACTIVE STAFF] - Follow up as needed (call tomorrow for

an appointment)